# Patient Record
Sex: FEMALE | Race: WHITE | NOT HISPANIC OR LATINO | Employment: FULL TIME | URBAN - METROPOLITAN AREA
[De-identification: names, ages, dates, MRNs, and addresses within clinical notes are randomized per-mention and may not be internally consistent; named-entity substitution may affect disease eponyms.]

---

## 2019-11-27 LAB
ABO + RH BLD: NORMAL
ABO + RH BLD: NORMAL
BLD GP AB SCN SERPL QL: NORMAL
C TRACH DNA SPEC QL PROBE+SIG AMP: NEGATIVE
HBV SURFACE AG SERPL QL IA: NORMAL
HIV 1+2 AB+HIV1 P24 AG SERPL QL IA: NON REACTIVE
N GONORRHOEA DNA SPEC QL PROBE+SIG AMP: NEGATIVE
RUBELLA ANTIBODY IGG QUANTITATIVE: NORMAL IU/ML
TREPONEMA ANTIBODIES: NON REACTIVE

## 2020-06-02 ENCOUNTER — HOSPITAL ENCOUNTER (OUTPATIENT)
Facility: CLINIC | Age: 35
Setting detail: OBSERVATION
Discharge: HOME | End: 2020-06-03
Attending: OBSTETRICS & GYNECOLOGY | Admitting: OBSTETRICS & GYNECOLOGY
Payer: COMMERCIAL

## 2020-06-02 DIAGNOSIS — R10.11 RIGHT UPPER QUADRANT ABDOMINAL PAIN: Primary | ICD-10-CM

## 2020-06-02 PROBLEM — R10.9 ABDOMINAL PAIN: Status: ACTIVE | Noted: 2020-06-02

## 2020-06-02 LAB
ALT SERPL W P-5'-P-CCNC: 24 U/L (ref 0–50)
AST SERPL W P-5'-P-CCNC: 20 U/L (ref 0–45)
BASOPHILS # BLD AUTO: 0 10E9/L (ref 0–0.2)
BASOPHILS NFR BLD AUTO: 0.2 %
DIFFERENTIAL METHOD BLD: ABNORMAL
EOSINOPHIL # BLD AUTO: 0.1 10E9/L (ref 0–0.7)
EOSINOPHIL NFR BLD AUTO: 0.4 %
ERYTHROCYTE [DISTWIDTH] IN BLOOD BY AUTOMATED COUNT: 13.2 % (ref 10–15)
HCT VFR BLD AUTO: 31.4 % (ref 35–47)
HGB BLD-MCNC: 10.9 G/DL (ref 11.7–15.7)
IMM GRANULOCYTES # BLD: 0.1 10E9/L (ref 0–0.4)
IMM GRANULOCYTES NFR BLD: 0.5 %
LYMPHOCYTES # BLD AUTO: 1.8 10E9/L (ref 0.8–5.3)
LYMPHOCYTES NFR BLD AUTO: 14.6 %
MCH RBC QN AUTO: 32.8 PG (ref 26.5–33)
MCHC RBC AUTO-ENTMCNC: 34.7 G/DL (ref 31.5–36.5)
MCV RBC AUTO: 95 FL (ref 78–100)
MONOCYTES # BLD AUTO: 1 10E9/L (ref 0–1.3)
MONOCYTES NFR BLD AUTO: 8.3 %
NEUTROPHILS # BLD AUTO: 9.4 10E9/L (ref 1.6–8.3)
NEUTROPHILS NFR BLD AUTO: 76 %
NRBC # BLD AUTO: 0 10*3/UL
NRBC BLD AUTO-RTO: 0 /100
PLATELET # BLD AUTO: 156 10E9/L (ref 150–450)
RBC # BLD AUTO: 3.32 10E12/L (ref 3.8–5.2)
SARS-COV-2 PCR COMMENT: NORMAL
SARS-COV-2 RNA SPEC QL NAA+PROBE: NEGATIVE
SARS-COV-2 RNA SPEC QL NAA+PROBE: NORMAL
SPECIMEN SOURCE: NORMAL
SPECIMEN SOURCE: NORMAL
URATE SERPL-MCNC: 5.1 MG/DL (ref 2.6–6)
WBC # BLD AUTO: 12.3 10E9/L (ref 4–11)

## 2020-06-02 PROCEDURE — 36415 COLL VENOUS BLD VENIPUNCTURE: CPT | Performed by: OBSTETRICS & GYNECOLOGY

## 2020-06-02 PROCEDURE — 84460 ALANINE AMINO (ALT) (SGPT): CPT | Performed by: OBSTETRICS & GYNECOLOGY

## 2020-06-02 PROCEDURE — G0378 HOSPITAL OBSERVATION PER HR: HCPCS

## 2020-06-02 PROCEDURE — U0003 INFECTIOUS AGENT DETECTION BY NUCLEIC ACID (DNA OR RNA); SEVERE ACUTE RESPIRATORY SYNDROME CORONAVIRUS 2 (SARS-COV-2) (CORONAVIRUS DISEASE [COVID-19]), AMPLIFIED PROBE TECHNIQUE, MAKING USE OF HIGH THROUGHPUT TECHNOLOGIES AS DESCRIBED BY CMS-2020-01-R: HCPCS | Performed by: OBSTETRICS & GYNECOLOGY

## 2020-06-02 PROCEDURE — 25000132 ZZH RX MED GY IP 250 OP 250 PS 637: Performed by: OBSTETRICS & GYNECOLOGY

## 2020-06-02 PROCEDURE — 85025 COMPLETE CBC W/AUTO DIFF WBC: CPT | Performed by: OBSTETRICS & GYNECOLOGY

## 2020-06-02 PROCEDURE — 84450 TRANSFERASE (AST) (SGOT): CPT | Performed by: OBSTETRICS & GYNECOLOGY

## 2020-06-02 PROCEDURE — 84550 ASSAY OF BLOOD/URIC ACID: CPT | Performed by: OBSTETRICS & GYNECOLOGY

## 2020-06-02 PROCEDURE — 25000132 ZZH RX MED GY IP 250 OP 250 PS 637

## 2020-06-02 PROCEDURE — 87086 URINE CULTURE/COLONY COUNT: CPT | Performed by: OBSTETRICS & GYNECOLOGY

## 2020-06-02 RX ORDER — ACETAMINOPHEN 325 MG/1
975 TABLET ORAL EVERY 8 HOURS PRN
Status: DISCONTINUED | OUTPATIENT
Start: 2020-06-02 | End: 2020-06-03 | Stop reason: HOSPADM

## 2020-06-02 RX ORDER — OXYCODONE HYDROCHLORIDE 5 MG/1
5 TABLET ORAL EVERY 4 HOURS PRN
Status: DISCONTINUED | OUTPATIENT
Start: 2020-06-02 | End: 2020-06-03 | Stop reason: HOSPADM

## 2020-06-02 RX ORDER — DOCUSATE SODIUM 100 MG/1
100 CAPSULE, LIQUID FILLED ORAL 2 TIMES DAILY
Status: DISCONTINUED | OUTPATIENT
Start: 2020-06-02 | End: 2020-06-03 | Stop reason: HOSPADM

## 2020-06-02 RX ORDER — HYDROXYZINE HYDROCHLORIDE 50 MG/1
50 TABLET, FILM COATED ORAL EVERY 6 HOURS PRN
Status: DISCONTINUED | OUTPATIENT
Start: 2020-06-02 | End: 2020-06-03 | Stop reason: HOSPADM

## 2020-06-02 RX ORDER — ACETAMINOPHEN 325 MG/1
975 TABLET ORAL EVERY 4 HOURS PRN
Status: DISCONTINUED | OUTPATIENT
Start: 2020-06-02 | End: 2020-06-02

## 2020-06-02 RX ORDER — NALOXONE HYDROCHLORIDE 0.4 MG/ML
.1-.4 INJECTION, SOLUTION INTRAMUSCULAR; INTRAVENOUS; SUBCUTANEOUS
Status: DISCONTINUED | OUTPATIENT
Start: 2020-06-02 | End: 2020-06-03 | Stop reason: HOSPADM

## 2020-06-02 RX ORDER — ONDANSETRON 4 MG/1
8 TABLET, ORALLY DISINTEGRATING ORAL EVERY 8 HOURS PRN
Status: DISCONTINUED | OUTPATIENT
Start: 2020-06-02 | End: 2020-06-03 | Stop reason: HOSPADM

## 2020-06-02 RX ORDER — ONDANSETRON 2 MG/ML
4 INJECTION INTRAMUSCULAR; INTRAVENOUS EVERY 6 HOURS PRN
Status: DISCONTINUED | OUTPATIENT
Start: 2020-06-02 | End: 2020-06-03 | Stop reason: HOSPADM

## 2020-06-02 RX ORDER — ACETAMINOPHEN 325 MG/1
TABLET ORAL
Status: COMPLETED
Start: 2020-06-02 | End: 2020-06-02

## 2020-06-02 RX ORDER — CALCIUM CARBONATE 500 MG/1
500 TABLET, CHEWABLE ORAL 3 TIMES DAILY PRN
Status: DISCONTINUED | OUTPATIENT
Start: 2020-06-02 | End: 2020-06-03 | Stop reason: HOSPADM

## 2020-06-02 RX ORDER — HYDROXYZINE HYDROCHLORIDE 50 MG/1
100 TABLET, FILM COATED ORAL EVERY 6 HOURS PRN
Status: DISCONTINUED | OUTPATIENT
Start: 2020-06-02 | End: 2020-06-03 | Stop reason: HOSPADM

## 2020-06-02 RX ADMIN — OXYCODONE HYDROCHLORIDE 5 MG: 5 TABLET ORAL at 19:06

## 2020-06-02 RX ADMIN — HYDROXYZINE HYDROCHLORIDE 100 MG: 50 TABLET, FILM COATED ORAL at 20:22

## 2020-06-02 RX ADMIN — ACETAMINOPHEN 975 MG: 325 TABLET, FILM COATED ORAL at 14:06

## 2020-06-02 RX ADMIN — OXYCODONE HYDROCHLORIDE 5 MG: 5 TABLET ORAL at 14:53

## 2020-06-02 RX ADMIN — CALCIUM CARBONATE (ANTACID) CHEW TAB 500 MG 500 MG: 500 CHEW TAB at 18:27

## 2020-06-02 RX ADMIN — DOCUSATE SODIUM 100 MG: 100 CAPSULE, LIQUID FILLED ORAL at 18:27

## 2020-06-02 RX ADMIN — ACETAMINOPHEN 975 MG: 325 TABLET ORAL at 14:06

## 2020-06-02 NOTE — H&P
34 yo  at 35 1/7 weeks gest with RUQ pain  Has had episodic RUQ discomfort throughout the pregnancy, and was associated with numbness,  but this changed character, and became more severe and constant last evening  Patient states that this radiates to her upper back, and up to the collarbone; not assoc with dyspnia  This is worse when the patient lays down or takes a deep breath  Baby is active, and there is no bleeding or regular contractions  Patient was evaluated at [facility] yesterday: PIH labs were normal other than slight decrease in platelets at 147,000 (150-400 thousand)  RUQ ultrasound this am shows no abnormalities other than a mildly dilated right ureter (likely physiological for pregnancy)  Normal NIPT  O  (neg)  AFP normal  Uneventful pregnancy otherwise      Allergies:Ceclor  Past surgical history: tonsils, wisdom teeth,mole removal    OE: H&N normal , no thyromegaly  CHEST: clear, no CVA tenderness  CVS: S1, S2 normal, no S3 or murmurs  ABD:gravid, uterus non tender, and soft  Severe discomfort on gentle pressure on the lower ribs, per pt recreates the pain    IMP: RUQ chest wall pain likely costochondritis,  unlikly pneumonia,or PE  Plan as per Dr. TILLMAN is to admit for pain control and reevaluate in am  Will order oral narcotics, with NST every shift,

## 2020-06-02 NOTE — PROVIDER NOTIFICATION
06/02/20 1600   Provider Notification   Provider Name/Title Dr Sow   Method of Notification Phone   Notification Reason Status Update   Dr Sow called unit for an update on pt. New orders received for cbc, ast, alt, uric acid and urine cx. Pt updated.

## 2020-06-02 NOTE — PROVIDER NOTIFICATION
06/02/20 1625   Provider Notification   Provider Name/Title Dr Maya   Method of Notification Phone   Notification Reason Status Update   Dr Maya called unit for an update on pt. No new orders received.

## 2020-06-02 NOTE — PLAN OF CARE
Pt continues to c/o right rib pain. Oxycodone partially relieves the pain. Pt up in room independently.  supportive at bedside.

## 2020-06-02 NOTE — PLAN OF CARE
Pt and her  arrived and admitted to room 211. Pt oriented to room. Prenatal reviewed. Awaiting Dr's orders.

## 2020-06-02 NOTE — PROVIDER NOTIFICATION
Dr Sow notified via phone of pt's lab results. New orders received for colace and atarax. Pt aware.

## 2020-06-03 ENCOUNTER — HOSPITAL ENCOUNTER (OUTPATIENT)
Facility: CLINIC | Age: 35
Setting detail: OBSERVATION
End: 2020-06-03
Admitting: OBSTETRICS & GYNECOLOGY
Payer: COMMERCIAL

## 2020-06-03 VITALS
OXYGEN SATURATION: 94 % | RESPIRATION RATE: 16 BRPM | SYSTOLIC BLOOD PRESSURE: 110 MMHG | TEMPERATURE: 98.8 F | DIASTOLIC BLOOD PRESSURE: 69 MMHG

## 2020-06-03 LAB
BACTERIA SPEC CULT: NO GROWTH
Lab: NORMAL
SPECIMEN SOURCE: NORMAL

## 2020-06-03 PROCEDURE — 25000132 ZZH RX MED GY IP 250 OP 250 PS 637: Performed by: OBSTETRICS & GYNECOLOGY

## 2020-06-03 PROCEDURE — G0378 HOSPITAL OBSERVATION PER HR: HCPCS

## 2020-06-03 RX ORDER — OXYCODONE HYDROCHLORIDE 5 MG/1
5 TABLET ORAL EVERY 4 HOURS PRN
Qty: 5 TABLET | Refills: 0 | Status: ON HOLD | OUTPATIENT
Start: 2020-06-03 | End: 2022-09-20

## 2020-06-03 RX ORDER — HYDROXYZINE HYDROCHLORIDE 50 MG/1
100 TABLET, FILM COATED ORAL EVERY 6 HOURS PRN
Qty: 20 TABLET | Refills: 0 | Status: ON HOLD | OUTPATIENT
Start: 2020-06-03 | End: 2022-09-20

## 2020-06-03 RX ADMIN — OXYCODONE HYDROCHLORIDE 5 MG: 5 TABLET ORAL at 00:18

## 2020-06-03 RX ADMIN — ACETAMINOPHEN 975 MG: 325 TABLET, FILM COATED ORAL at 00:18

## 2020-06-03 NOTE — PLAN OF CARE
BPP cancelled due to reactive NST obtained.  Informed pt of change in FHT agreeable to discontinuing the BPP.

## 2020-06-03 NOTE — PLAN OF CARE
"Pt reports \"feeling better\" today after sleeping well last night with vistaril and oxycodone.  She is still \"uncomfortable\" this morning but declines pain meds and states pain is \"manageable\".  After speaking with Dr. Sow this am pt is comfortable being discharged home with oxycodone and vistaril and agreeable to plan of care for follow up.  She denies vaginal bleeding, ROM, cramping or regular contraction pain.  Pt is able to ambulate independently   0912-Informed Dr. Sow fetal tracing is non reactive at this time after extended monitoring.  BPP recommended and pt is agreeable.  "

## 2020-06-03 NOTE — DISCHARGE INSTRUCTIONS
Discharge Instruction for Undelivered Patients      You were seen for: uncontrolled pain  We Consulted: Dr. Sow  You had (Test or Medicine):fetal and uterine monitoring, oxycodone, vistaril     Diet:   Drink 8 to 12 glasses of liquids (milk, juice, water) every day.  You may eat meals and snacks.     Activity:  Call your doctor or nurse midwife if your baby is moving less than usual.     Call your provider if you notice:  Swelling in your face or increased swelling in your hands or legs.  Headaches that are not relieved by Tylenol (acetaminophen).  Changes in your vision (blurring: seeing spots or stars.)  Nausea (sick to your stomach) and vomiting (throwing up).   Weight gain of 5 pounds or more per week.  Heartburn that doesn't go away.  Signs of bladder infection: pain when you urinate (use the toilet), need to go more often and more urgently.  The bag of knott (rupture of membranes) breaks, or you notice leaking in your underwear.  Bright red blood in your underwear.  Abdominal (lower belly) or stomach pain.  For first baby: Contractions (tightening) less than 5 minutes apart for one hour or more.  Increase or change in vaginal discharge (note the color and amount)      Follow-up:  As scheduled in the clinic

## 2020-06-03 NOTE — PLAN OF CARE
"VSS throughout night. Fetal/uterine monitoring appropriate, see flowsheet. Patient awake when RN enters room to round this morning. Spouse at bedside, attentive to needs. Pt reports sleeping a total of 6 or so hours last night and states \"I think that helped a lot\". Pt denies pain at this time, but also points out that she has not done much moving around. Patient denies other needs at this time, call light within reach, reminded to order breakfast. POC discussed with patient and spouse.  "

## 2020-06-03 NOTE — PROGRESS NOTES
[facility] Obstetrics Antepartum Progress Note  6/3/2020     S: Pt doing well. Pain is not well controlled. GFM. Slept overnight but has to sleep more upright and can't roll to her sides. Bleeding None. Normal bowel and bladder functions.    O:  /70   Temp 97.2  F (36.2  C) (Temporal)   Resp 16   SpO2 94%    Gen: healthy, alert and no distress    Resp: nonlabored breathing but breathing shallow due to rib pain  Abd: soft, gravid  Ext: non-tender, no edema    Recent Results (from the past 24 hour(s))   Asymptomatic COVID-19 Virus (Coronavirus) by PCR    Collection Time: 06/02/20  2:40 PM    Specimen: Nasopharyngeal   Result Value Ref Range    COVID-19 Virus PCR to [facility] - Source Nasopharyngeal     COVID-19 Virus PCR to [facility] - Result       Test received-See reflex to IDDL test SARS CoV2 (COVID-19) Virus RT-PCR   SARS-CoV-2 COVID-19 Virus (Coronavirus) RT-PCR Nasopharyngeal    Collection Time: 06/02/20  2:40 PM    Specimen: Nasopharyngeal   Result Value Ref Range    SARS-CoV-2 Virus Specimen Source Nasopharyngeal     SARS-CoV-2 PCR Result NEGATIVE     SARS-CoV-2 PCR Comment       This automated, real-time RT-PCR assay by [employer] on the [employer] has   been given Emergency Use Authorization (EUA) for the in vitro qualitative detection of RNA   from the SARS-CoV2 virus in nasopharyngeal swabs in viral transport medium from patients   with signs and symptoms of infection who are suspected of COVID-19. The performance is   unknown in asymptomatic patients.     CBC with platelets differential    Collection Time: 06/02/20  4:17 PM   Result Value Ref Range    WBC 12.3 (H) 4.0 - 11.0 10e9/L    RBC Count 3.32 (L) 3.8 - 5.2 10e12/L    Hemoglobin 10.9 (L) 11.7 - 15.7 g/dL    Hematocrit 31.4 (L) 35.0 - 47.0 %    MCV 95 78 - 100 fl    MCH 32.8 26.5 - 33.0 pg    MCHC 34.7 31.5 - 36.5 g/dL    RDW 13.2 10.0 - 15.0 %    Platelet Count 156 150 - 450 10e9/L    Diff Method Automated Method     %  Neutrophils 76.0 %    % Lymphocytes 14.6 %    % Monocytes 8.3 %    % Eosinophils 0.4 %    % Basophils 0.2 %    % Immature Granulocytes 0.5 %    Nucleated RBCs 0 0 /100    Absolute Neutrophil 9.4 (H) 1.6 - 8.3 10e9/L    Absolute Lymphocytes 1.8 0.8 - 5.3 10e9/L    Absolute Monocytes 1.0 0.0 - 1.3 10e9/L    Absolute Eosinophils 0.1 0.0 - 0.7 10e9/L    Absolute Basophils 0.0 0.0 - 0.2 10e9/L    Abs Immature Granulocytes 0.1 0 - 0.4 10e9/L    Absolute Nucleated RBC 0.0    AST    Collection Time: 20  4:17 PM   Result Value Ref Range    AST 20 0 - 45 U/L   ALT    Collection Time: 20  4:17 PM   Result Value Ref Range    ALT 24 0 - 50 U/L   Uric acid    Collection Time: 20  4:17 PM   Result Value Ref Range    Uric Acid 5.1 2.6 - 6.0 mg/dL   Urine Culture    Collection Time: 20  5:20 PM    Specimen: Urine clean catch; Midstream Urine   Result Value Ref Range    Specimen Description Midstream Urine     Special Requests Specimen received in preservative     Culture Micro PENDING    ]  A: 35 year old  HD#1 IUP at 35 wks  admitted for right sided pain-costochondritis    P: Will be discontinue home since we ruled out all other causes for pain  Pt is to see chiro today  Labs were stable  To use heat or ice to help with pain  Will give oxycodone and vistaril to try to help  F/u in office next week but to call sooner if things worsen      Amy Sow, DO   Obstetrics, Gynecology, and Infertility

## 2020-06-03 NOTE — PLAN OF CARE
Report received from RADHA Gupta RN. Pt care assumed. POC discussed at bedside with pt and spouse. To perform monitoring and give visteril at 2030 per patient request.

## 2020-06-03 NOTE — PLAN OF CARE
Monitoring complete, patient reports intermittent pain in RUQ of abdomen, but states she feels is going to be able to rest. Call light in reach, patient agrees to notify RN if anything is needed. Spouse at bedside and supportive. Pt denies need for additional nausea medication, and is aware of tylenol and oxycodone that is available and their due times should she need.

## 2020-06-03 NOTE — PLAN OF CARE
Pt was given meds from discharge pharmacy along with triage discharge instructions.  Pt verbalized understanding and agreement with plan of care.  Discharged ambulatory at 1000.

## 2020-06-03 NOTE — PLAN OF CARE
Pt with call light on. Reports she has slept for approximately 3 hours prior to calling but that she has woken up and the pain has returned. Tylenol and Oxycodone given. Denies other needs at this time and states she will attempt to continue to sleep.

## 2020-06-03 NOTE — PROVIDER NOTIFICATION
06/02/20 2027   Provider Notification   Provider Name/Title Dr Adams   Method of Notification Electronic Page   Request Evaluate - Remote   Notification Reason Other (Comment)  (order clarification)   Provider updated regarding patient nausea. PO zofran order received. Also clarified orders for monitoring TID and vital signs Q 4 hours while awake. Orders entered and modified as needed. Will continue to monitor.

## 2022-09-07 ENCOUNTER — OFFICE VISIT (OUTPATIENT)
Dept: MATERNAL FETAL MEDICINE | Facility: CLINIC | Age: 37
End: 2022-09-07
Attending: OBSTETRICS & GYNECOLOGY
Payer: COMMERCIAL

## 2022-09-07 ENCOUNTER — PRE VISIT (OUTPATIENT)
Dept: MATERNAL FETAL MEDICINE | Facility: CLINIC | Age: 37
End: 2022-09-07

## 2022-09-07 ENCOUNTER — TRANSCRIBE ORDERS (OUTPATIENT)
Dept: MATERNAL FETAL MEDICINE | Facility: CLINIC | Age: 37
End: 2022-09-07

## 2022-09-07 DIAGNOSIS — O09.522 MULTIGRAVIDA OF ADVANCED MATERNAL AGE IN SECOND TRIMESTER: ICD-10-CM

## 2022-09-07 DIAGNOSIS — O26.90 PREGNANCY RELATED CONDITION, ANTEPARTUM: ICD-10-CM

## 2022-09-07 DIAGNOSIS — O26.90 PREGNANCY RELATED CONDITION, ANTEPARTUM: Primary | ICD-10-CM

## 2022-09-07 DIAGNOSIS — O28.0 ABNORMAL MATERNAL SERUM SCREENING TEST: Primary | ICD-10-CM

## 2022-09-07 PROCEDURE — 96040 HC GENETIC COUNSELING, EACH 30 MINUTES: CPT | Performed by: GENETIC COUNSELOR, MS

## 2022-09-07 NOTE — PROGRESS NOTES
[facility] [place]  Genetic Counseling Consult    Patient: Rachel Ryder YOB: 1985   Date of Service: 22      Rachel Ryder was seen at [facility] [place] for genetic consultation due to a positive noninvasive prenatal screen for trisomy 21 and advanced maternal age. The patient was accompanied by her partner, Ty to today's visit.        Impression/Plan:   1.  Rachel had a genetic counseling consultation today. She underwent [id number] NIPT earlier in this pregnancy, which screened POSITIVE for trisomy 21 (sex of baby NOT disclosed). We reviewed this result in detail today and availability of diagnostic testing as well as close ultrasound surveillance. The couple would like to proceed with amniocentesis as soon as possible, this has been scheduled for . Rachel and Ty appeared understandably saddened and are processing this unexpected result. They were provided with written information regarding amniocentesis as well as my direct contact information and encouraged to reach out as questions or concerns arise.     Pregnancy History:   /Parity:    Age at Delivery: 37 year old  MIRIAM: 3/7/2023, by Last Menstrual Period  Gestational Age: 14w1d    Rachel cutler pregnancy history is significant for one term vaginal delivery.    Medical History:   Rachel cutler reported medical history is not expected to impact pregnancy management or risks to fetal development.       Risk Assessment for Chromosome Conditions:   We explained that the risk for fetal chromosome abnormalities increases with maternal age. We discussed specific features of common chromosome abnormalities, including Down syndrome, trisomy 13, trisomy 18, and sex chromosome trisomies.      - At age 37 at midtrimester, the risk to have a baby with Down syndrome is 1 in 274.     - At age 37 at midtrimester, the risk to have a baby with any chromosome abnormality is 1 in 135.       Rachel  underwent [id number] NIPT with her primary OB which returned screen POSITIVE for trisomy 21 The results are normal for chromosome 13, chromosome 18, and sex chromosomes (no aneuploidy detected).  These results increase the risk of fetal aneuploidy for chromosome 21. We discussed the possible explanations for a positive NIPT result include:  o This result could represent a true positive. Using the patient's age related risk for trisomy 21 and the sensitivity and specificity of the NIPT test for [facility] laboratory, the calculated positive predictive value (the likelihood this is a real result) is approximately 78.9% (as calculated by the performing laboratory). Trisomy 21 or Down syndrome is a variable chromosome condition that is characterized by specific facial features, low muscle tone, and variable intellectual disabilities. Some children with Down syndrome can also have heart defects, vision issues, hearing loss, thyroid concerns, and additional health concerns. Down syndrome most often occurs due to nondisjunction with a low recurrence risk, however more rarely can be the result of a translocation. Chromosome structure is assessed on chromosome analysis.   o Rachel 's result could represent a false positive and her pregnancy may be at low risk for aneuploidy. Given PPV noted above, there is an estimated 21.1% chance this is a false positive result. With a false positive result, there can be a concern for confined placental mosaicism in which case there are trisomy 21 cells in the placenta, however not in the fetus.     We reviewed that NIPT is a screening test and does not replace the accuracy obtained from invasive prenatal testing. Rachel was offered the option of diagnostic testing. We reviewed the benefits and limitations of amniocentesis.  We also discussed the option of close ultrasound surveillance. Prenatal ultrasound, however does not rule out or diagnose chromosome conditions though can provide  additional information to the clinical picture and interpretation of result (especially in the setting of ultrasound findings).     Genetic Amniocentesis  - This is an invasive procedure typically performed at ~15 weeks or later, through which amniotic fluid is obtained for the purpose of chromosome analysis and/or other prenatal genetic analysis.  - Amniocentesis is considered a diagnostic test for chromosome problems during pregnancy.  - The risk of complications including pregnancy loss associated with amniocentesis is generally estimated to be 1/500.  - We reviewed TAT for FISH preliminary analysis (typically 24 hours) and final chromosome (up to two weeks)   - We reviewed possibility that membranes may not be fused with an gestational age in the amniocentesis window  - We also discussed limitations of CVS procedure >14w GA vs amniocentesis procedure after 15w including testing turn around time and that amniocentesis avoids diagnostic uncertainty related to confined placental mosaicism.      Preliminary FISH result/sampling by direct examination of cytotrophoblast can have the risk of false positive results, and therefore long-term cultures of mesenchymal cells (cultured chromosome analysis) is also performed concurrently as these cells better reflect fetal, rather than the placental, genotype. This is particularly important when CVS is performed because of a positive result from testing cell-free nucleic acids in maternal blood (NIPT), since these nucleic acids also originate from cytotrophoblast. Where as amniocentesis is testing primarily epithelioid cells (more directly related to fetus). Given current gestational age, FISH on amniocentesis would likely have a quicker TAT than testing performed on CVS culture.     The couple would like to proceed with amniocentesis as soon as possible, this has been scheduled for 9/16.     We reviewed the benefits and limitations of this testing. Screening tests provide a  risk assessment specific to the pregnancy for certain fetal chromosome abnormalities, but cannot definitively diagnose or exclude a fetal chromosome abnormality. Follow-up genetic counseling and consideration of diagnostic testing is recommended with any abnormal screening result.     Diagnostic tests carry inherent risks- including risk of miscarriage- that require careful consideration. These tests can detect fetal chromosome abnormalities with greater than 99% certainty. Results can be compromised by maternal cell contamination or mosaicism, and are limited by the resolution of cytogenetic G-banding technology. There is no screening nor diagnostic test that can detect all forms of birth defects or mental disability.     It was a pleasure to be involved with [facility]. Face-to-face time of the meeting was 20 minutes.    Roz Chowdhury MS, [facility]  Licensed Genetic Counselor  [facility]  Maternal Fetal Medicine  Ph: [phone]  [email or web address]

## 2022-09-08 ENCOUNTER — TELEPHONE (OUTPATIENT)
Dept: MATERNAL FETAL MEDICINE | Facility: CLINIC | Age: 37
End: 2022-09-08

## 2022-09-08 NOTE — TELEPHONE ENCOUNTER
September 8, 2022    Email reply to Rachel:    Rachel and Ty,    Thank you for reaching out, I was thinking about you both and hope you are doing the best you can be at this time. Again I am so sorry for the unexpected information you have received in this pregnancy. I know that these are not easy scenarios to think about and ones that we never thought or hope we would have to. We are here to support you in whatever way is most helpful to you.     Our team works closely with couples who are faced with these impossible and difficult decisions every day, please know that you are not alone and that we are here help you navigate next steps as they come. It can be really challenging to process difficult information as it comes in real time and it s okay to take as much time as needed to do so. Please do continue to send questions as they arise.     I usually start by confirming with your primary OB office that they do not have a set process and that they would like for us to coordinate. We first begin with looking into insurance coverage as this can sometimes be a deciding factor for where someone might choose to go (hospital setting vs outpatient). We do this by sending insurance information to our financial counselors who then contact your insurance directly to review your benefits and coverage for these services. This can be initiated at any point (our financial counselors work Monday through Friday) and usually takes 1-2 days for us to hear back. There is a surgical procedure called D&E that can be done within our [employer] system as well as through community resources such as [facility] ([facility] Parenthood is the most affordable option for couple s who do not have insurance coverage for these services as they have significantly lower out of pocket costs though I always like to make sure I mention that it is primarily the same doctors that work at both our hospital and planned parenthood). There is also the  option for induction of labor in the second trimester that can be done only in a hospital setting including in our [facility] system (can t be done at [facility]). Once insurance coverage is checked, we then help gather information to send to either our group here ([facility]) or planned parenthood and then their team begins checking availability and reaches out to you directly to coordinate appointments. If within our [place] system, one of our doctors also has to complete  the right to know  form where they read through a legal piece of information that just states that you have been provided your options and this just has to be done 24 hours prior to the beginning of any procedure. How quickly this process happens depends a lot on insurance coverage and availability, which can be very variable.     Please let me know if there s any additional information that would be helpful to have at this time,    Roz Chowdhury MS, [facility]  Licensed Genetic Counselor  [facility]  Maternal Fetal Medicine  Ph: [phone]  [email or web address]

## 2022-09-08 NOTE — TELEPHONE ENCOUNTER
September 8, 2022    Email received from Rachel:    Wilver Courtney,     My name is Rachel Ryder and my  and I met with you yesterday.  We really appreciate the information that you provided us and the space you gave us to process.  I know that we have the amnio scheduled for next Friday and that we will have the FISH on Saturday.    I want to be prepared for all scenarios and next steps.  If the result confirms the results of the initial screener and if we make the impossible decision to terminate the pregnancy - what is that process? what happens next? and who do we work with?  I just want to have all the information and process it in advance as I know I may have a hard time doing so in real time.     I appreciate your help in navigating this situation.    Rachel Crawford

## 2022-09-09 NOTE — TELEPHONE ENCOUNTER
September 9, 2022    I spoke with KAYLEIGH Tinajero with Rachel's primary OB office to provide update regarding upcoming amniocentesis and testing timeline. She shared that she believes their office prefers that we help coordinate TOP and she will update Dr. Sow. I will continue to update their team as results return.     Roz Chowdhury MS, [facility]  Licensed Genetic Counselor  [facility]  Maternal Fetal Medicine  Ph: [phone]  [email or web address]

## 2022-09-09 NOTE — TELEPHONE ENCOUNTER
September 9, 2022    Email received from Rachel:    MARIUSZ Courtney,     Thank you so much for the information.  It's both informative and difficult to hear as I hadn't yet fully thought about or processed the physical toll of terminating a pregnancy during the 2nd trimester - I've only been thinking about the emotional.  At this point, I believe this is the decision that Ty and I would make if the results are confirmed next week.  Is it possible to start the process of having you talk to our OB's office as well as send our insurance information to your financial counselors?  The waiting and the awareness that I continue to grow a baby that I may decide to terminate is unbearable.  Should this be the decision that we have to make I want to be able to move as quickly as possible.   I feel like a monster saying that but it's the truth for me.     Thanks Rachel Courtney + Ty

## 2022-09-09 NOTE — TELEPHONE ENCOUNTER
September 9, 2022    Email reply sent to Rachel:    What you have shared with me as you are processing are very expected and normal thoughts, feelings, and questions. I know our physicians often share that the emotional toll/recovery may feel much more significant than the physical recovery. I do have information about taking care after D&E or induction that I can share at any time if that would be helpful, just let me know.     I just updated Dr. Sow s team with our plan and they did share that they would have us help coordinate next steps if needed. I will continue to keep their team in the loop. I just sent along insurance information to our financial counselors, which I would expect back early next week. I will follow up with you at that time.     It is not a monstrous thought to want to move forward once that difficult decision is made, and that thought is one that I think is often shared with most couples who may be in the same situation. I wanted to let you know that we do have an amazing resource within our team, her name is Jia Wills. She is a behavioral health clinician who works closely with our pregnant patients who may desire additional support and space to talk through things. She offers video/phone visits as well and I would be happy to help connect you with her if you are ever interested.     Roz Chowdhury MS, [facility]  Licensed Genetic Counselor  [facility]  Maternal Fetal Medicine  Ph: [phone]  [email or web address]

## 2022-09-12 NOTE — TELEPHONE ENCOUNTER
September 12, 2022    Email sent to Rachel:    Rachel and Ty,    The financial counselors shared that they spoke with [employer] who stated that prior authorization is required and they request we send clinical records for review. I am hesitant because at this time we technically don t have a diagnosis in the pregnancy, which I think would be most important to provide for insurance PA review. I know this isn t preferred, but I think we might have the highest chance of a PA being approved if we were able to provide diagnostic results for the pregnancy and I could fax this first thing Monday morning.     Here is the additional information regarding where you are at with your current benefits,    BENEFITS:   Co-insurance: 20 %  Family out of pocket: $2870.71 remaining  Family Deductible: $0 remaining    In terms of moving as quick as possible if needed, my other thought is if we wanted to line something up in the event that the amniocentesis is positive and insurance does not approve the PA, we could coordinate an appointment with [facility] knowing that we can cancel if needed. I can always reach out to Annette, their  to check when their next available D&E appointment would be.      Let me know your thoughts,     Roz Chowdhury MS, [facility]  Licensed Genetic Counselor  [facility]  Maternal Fetal Medicine  Ph: [phone]  [email or web address]

## 2022-09-12 NOTE — TELEPHONE ENCOUNTER
September 12, 2022    Left voicemail for Annette,  with [facility] to inquire about their next available D&E appointment after Friday.     Roz Chowdhury MS, [facility]  Licensed Genetic Counselor  [facility]  Maternal Fetal Medicine  Ph: [phone]  [email or web address]

## 2022-09-12 NOTE — TELEPHONE ENCOUNTER
September 12, 2022    Received voicemail from Rachel's partner, Ty ([phone]) to discuss pregnancy information further. Attempted to reach Rachel directly (no consent to communicate on file). Sent email to both with option to call back together or complete consent to communicate.     Also received message [facility], Rachel has appointment for D&E scheduled on 9/26. Following Friday's [facility] appointments they have requested we send over records.     Roz Chowdhury MS, [facility]  Licensed Genetic Counselor  [facility]  Maternal Fetal Medicine  Ph: [phone]  [email or web address]

## 2022-09-12 NOTE — TELEPHONE ENCOUNTER
Date    Email reply to Jemima:    R did not provide an estimated time frame for how long the PA can take. As far as scheduling the procedure in our system, it can be extremely variable. We first send the referral to [facility] and they work with their provider availability and availability in the OR to coordinate. Sometimes they can be scheduled out anywhere from ~1-4 weeks. [place] in general has experienced an increase in these referrals due to legal changes in surrounding states.     I spoke with Annette, their next available appointments are the week of Sept. 26th. She shared that their estimated out of pocket cost starts around $1790. She said sometimes they have different experiences with insurance coverage because they can bill as an office setting as opposed to hospital and they do run their own insurance checks. Annette s direct contact number is [phone] if you wanted to move forward with scheduling anything or have any questions for her.     My understanding is that the D&E procedure has an estimated cost that is significantly higher in our hospital setting, in which case I agree that I would expect you to be paying at least the remaining $2870 toward your out of pocket and then co-insurance. [facility] is able to put together a cost of care estimate if that would be helpful?     Roz Chowdhury MS, [facility]  Licensed Genetic Counselor  [facility]  Maternal Fetal Medicine  Ph: [phone]  [email or web address]

## 2022-09-12 NOTE — TELEPHONE ENCOUNTER
September 12, 2022    Email received from Rachel and Ty:    Good Morning Roz,    Thank you for your continued communication, we really appreciate it.    If we send the test info to [facility] on Monday - what is the estimated timeframe for a response and scheduling of a D&E?    Please reach out to [facility] with [facility] to understand the schedule availability. Could she also provide an estimate for the procedure? It feels likely we will spend at least the $2870 in remaining out of pocket for a covered procedure regardless. It would be helpful to understand the potential costs side by side.    Ty & Rachel

## 2022-09-13 NOTE — TELEPHONE ENCOUNTER
September 13, 2022    I spoke with Ty to review insurance investigation information. Per financial counselors:    Call from [facility], cristina Castillo, Approval #[id number] from 9/9/22-12/7/22.  Any questions call Anna at [phone].    BENEFITS:  Co-insurance: 20 %  Family out of pocket: $2870.71 remaining  Family Deductible: $0 remaining    They would like us to send referral to [facility] to coordinate D&E appointment ASAP following preliminary FISH results on 9/17. Their clinic will then reach out to [place] to coordinate appointments.     Roz Chowdhury MS, [facility]  Licensed Genetic Counselor  [facility]  Maternal Fetal Medicine  Ph: [phone]  [email or web address]    Addendum: [facility] referral faxed 9/13/22

## 2022-09-13 NOTE — TELEPHONE ENCOUNTER
September 13, 2022    Attempted to follow up with Ty to provide update regarding insurance coverage. Left direct contact information for call back.     Roz Chowdhury MS, [facility]  Licensed Genetic Counselor  [facility]  Maternal Fetal Medicine  Ph: [phone]  [email or web address]

## 2022-09-13 NOTE — TELEPHONE ENCOUNTER
September 13, 2022    Received email from [place] with completed authorization to share protected health information with her partner Ty and the couple requested a call at 1:30pm today.     Roz Chowdhury MS, [facility]  Licensed Genetic Counselor  [facility]  Maternal Fetal Medicine  Ph: [phone]  [email or web address]

## 2022-09-14 ENCOUNTER — TELEPHONE (OUTPATIENT)
Dept: OBGYN | Facility: CLINIC | Age: 37
End: 2022-09-14

## 2022-09-14 DIAGNOSIS — Z33.2 TERMINATION OF PREGNANCY (FETUS): Primary | ICD-10-CM

## 2022-09-14 DIAGNOSIS — Z01.818 PREOPERATIVE EXAMINATION: Primary | ICD-10-CM

## 2022-09-14 RX ORDER — MISOPROSTOL 200 UG/1
TABLET ORAL
Qty: 2 TABLET | Refills: 0 | Status: ON HOLD | OUTPATIENT
Start: 2022-09-14 | End: 2022-09-20

## 2022-09-14 RX ORDER — ACETAMINOPHEN 325 MG/1
975 TABLET ORAL ONCE
Status: CANCELLED | OUTPATIENT
Start: 2022-09-14 | End: 2022-09-14

## 2022-09-14 NOTE — TELEPHONE ENCOUNTER
Referral received from [facility] for D&E.  She is being referred to [facility] because of pos NIPT for down syndrome.  Pending amnio/FISH results 9/17/22  Gestational age 15w1d  EDD3/7/23  Medical records have been received    WTRK consent to be completed:at [facility] 9/17/22

## 2022-09-14 NOTE — TELEPHONE ENCOUNTER
September 14, 2022    I spoke with Yoanna with [facility] who confirmed receipt of referral.     Rachel inquired about contact info and I provided her with [facility] number.     Roz Chowdhury MS, [facility]  Licensed Genetic Counselor  [facility]  Maternal Fetal Medicine  Ph: [phone]  [email or web address]

## 2022-09-15 NOTE — TELEPHONE ENCOUNTER
Day of Surgery:    Your surgery is scheduled at [facility].  [facility] [place], [place].  [address]  [id number].  If parking is needed please park in the [place].  If you are unsure how to get to the hospital - search google maps for [facility] [address].    Just stop at the  and security will tell you where you need to go for your surgery.    Arrive at the hospital 2 hours before your surgery at 9 AM on 9/20/22.  Your surgery is scheduled at 11 AM.  Please do not eat or drink after 3 AM.  You may have sips of clear liquids (water, black coffee, Gatorade) up to 9AM.  If you have been prescribed medication to take before surgery or if you have prescription medications that you take everyday, you can take those with a sip of water. Please shower the night before and the morning of the surgery with a special soap called Hibiclens.  If you wash your hair, wash with the special soap after you wash your hair.     Cytotec is medication that will be sent to your preferred pharmacy. This medication should be placed between your cheek and gum in your lower jaw, 2 hours before surgery.    You will need to take a home COVID test, no more than 2 days before surgery, take a picture of it and bring it to your surgery.      A responsible adult will need to drive you home after surgery.

## 2022-09-15 NOTE — TELEPHONE ENCOUNTER
September 15, 2022    Received voicemail from Ty to review upcoming appointments.     Returned the couple's call and spoke with Ty and Rachel regarding what to expect for their upcoming MFM appointment tomorrow. They would like screen to be off for ultrasound. We also reviewed plan for 24hr WRTK to be completed tomorrow.     Roz Chowdhury MS, [facility]  Licensed Genetic Counselor  [facility]  Maternal Fetal Medicine  Ph: [phone]  [email or web address]

## 2022-09-16 ENCOUNTER — HOSPITAL ENCOUNTER (OUTPATIENT)
Dept: ULTRASOUND IMAGING | Facility: CLINIC | Age: 37
Discharge: HOME | End: 2022-09-16
Attending: OBSTETRICS & GYNECOLOGY
Payer: COMMERCIAL

## 2022-09-16 ENCOUNTER — OFFICE VISIT (OUTPATIENT)
Dept: MATERNAL FETAL MEDICINE | Facility: CLINIC | Age: 37
End: 2022-09-16
Attending: OBSTETRICS & GYNECOLOGY
Payer: COMMERCIAL

## 2022-09-16 DIAGNOSIS — O26.90 PREGNANCY RELATED CONDITION, ANTEPARTUM: Primary | ICD-10-CM

## 2022-09-16 DIAGNOSIS — O26.90 PREGNANCY RELATED CONDITION, ANTEPARTUM: ICD-10-CM

## 2022-09-16 DIAGNOSIS — O28.0 ABNORMAL MATERNAL SERUM SCREENING TEST: ICD-10-CM

## 2022-09-16 DIAGNOSIS — O09.522 MULTIGRAVIDA OF ADVANCED MATERNAL AGE IN SECOND TRIMESTER: ICD-10-CM

## 2022-09-16 DIAGNOSIS — O28.0 ABNORMAL MATERNAL SERUM SCREENING TEST: Primary | ICD-10-CM

## 2022-09-16 DIAGNOSIS — Z67.91 RH NEGATIVE, ANTEPARTUM: ICD-10-CM

## 2022-09-16 DIAGNOSIS — O28.3 ABNORMAL PRENATAL ULTRASOUND: ICD-10-CM

## 2022-09-16 DIAGNOSIS — O26.899 RH NEGATIVE, ANTEPARTUM: ICD-10-CM

## 2022-09-16 PROCEDURE — 88271 CYTOGENETICS DNA PROBE: CPT | Performed by: OBSTETRICS & GYNECOLOGY

## 2022-09-16 PROCEDURE — 76946 ECHO GUIDE FOR AMNIOCENTESIS: CPT

## 2022-09-16 PROCEDURE — 76805 OB US >/= 14 WKS SNGL FETUS: CPT | Mod: 26 | Performed by: OBSTETRICS & GYNECOLOGY

## 2022-09-16 PROCEDURE — 96372 THER/PROPH/DIAG INJ SC/IM: CPT | Performed by: OBSTETRICS & GYNECOLOGY

## 2022-09-16 PROCEDURE — 88235 TISSUE CULTURE PLACENTA: CPT | Performed by: OBSTETRICS & GYNECOLOGY

## 2022-09-16 PROCEDURE — 250N000011 HC RX IP 250 OP 636: Performed by: OBSTETRICS & GYNECOLOGY

## 2022-09-16 PROCEDURE — 88275 CYTOGENETICS 100-300: CPT | Performed by: OBSTETRICS & GYNECOLOGY

## 2022-09-16 PROCEDURE — 76805 OB US >/= 14 WKS SNGL FETUS: CPT

## 2022-09-16 PROCEDURE — 999N000069 HC STATISTIC GENETIC COUNSELING, < 16 MIN: Performed by: GENETIC COUNSELOR, MS

## 2022-09-16 PROCEDURE — 88269 CHROMOSOME ANALYS AMNIOTIC: CPT | Performed by: OBSTETRICS & GYNECOLOGY

## 2022-09-16 PROCEDURE — 59000 AMNIOCENTESIS DIAGNOSTIC: CPT | Performed by: OBSTETRICS & GYNECOLOGY

## 2022-09-16 PROCEDURE — 76946 ECHO GUIDE FOR AMNIOCENTESIS: CPT | Mod: 26 | Performed by: OBSTETRICS & GYNECOLOGY

## 2022-09-16 RX ADMIN — HUMAN RHO(D) IMMUNE GLOBULIN 300 MCG: 300 INJECTION, SOLUTION INTRAMUSCULAR at 12:40

## 2022-09-16 NOTE — NURSING NOTE
Pt here for amniocentesis d/t NIPT + T21. Saw [facility], see their dictation.  After consent signed and TimeOut completed, Dr. Calhoun and Dr. Marquez withdrew adequate fluid xone transabdominal pass.    Patient reports sliight pain.  Pt is RH negative.  MD reviewed blood type and screen and Rhogam is indicated.  Rhogam was given today at 1240.  LOT#:  [id number], EXP 10/15/2023. Discharge teaching completed and questions answered.  Pt discharged ambulatory and stable.   Lab alerted by calling phone number on amnio work-aid for [facility].  Cytogenetics notified of specimen.  Specimen transported to main lab, warm hand-off completed to [facility] at 1257.

## 2022-09-16 NOTE — PROGRESS NOTES
[facility] Maternal Fetal Medicine Center  Genetic Counseling Consult    Patient:  Rachel Ryder YOB: 1985   Date of Service:  22      Rachel Ryder was seen at the [facility]aternal [facility]etal [facility] Center for genetic consultation as part of her appointment for ultrasound and amniocentesis due to a positive noninvasive prenatal screen for trisomy 21 and advanced maternal age. The patient was accompanied by her partner, Ty to today's visit.        Impression/Plan:   1. I met with the couple earlier in this pregnancy to review their positive noninvasive prenatal screen for trisomy 21, see prior note. Today they have elected to proceed with ultrasound and genetic amniocentesis. The following testing was ordered on the prenatal sample: FISH preliminary analysis and final chromosome analysis.  Results are expected within 24-48 hours for FISH and two weeks for chromosome analysis. Results will be available in EPIC.  We will contact her to discuss the results, and a copy will be forwarded to the office of the referring OB provider. Rachel requested that we do not leave any results in her voicemail and they are NOT finding out sex of baby information. We reviewed plan for me to call the couple with FISH results over the weekend and then I will notify them via email once final chromosome analysis is available and they will reach out when they are ready to review. The couple is planning to pursue D&E if FISH results are positive and are aware that these are preliminary results. They will complete the 24hr WRTK with Dr. Calhoun today. They have D&E scheduled with [facility] for Tuesday pending results and have also [facility].     Addendum: today's ultrasound revealed a congenital heart defect and thick nuchal fold.     Pregnancy History:   /Parity:    Age at Delivery: 37 year old  MIRIAM: 3/7/2023, by Last Menstrual Period  Gestational Age:  15w3d      Rachel underwent [id number] NIPT with her primary OB which returned screen POSITIVE for trisomy 21 The results are normal for chromosome 13, chromosome 18, and sex chromosomes (no aneuploidy detected).  These results increase the risk of fetal aneuploidy for chromosome 21. We had previously discussed the possible explanations for a positive NIPT result include:  ? This result could represent a true positive, the performing laboratory calculates the PPV at 78.9%. Today we reviewed that Down syndrome most often occurs due to nondisjunction with a low recurrence risk, however more rarely can be the result of a translocation. Chromosome structure is assessed on chromosome analysis. Rachel inquired about ability to determine severity and we reviewed that genetic testing is not able to determine the severity of Down syndrome, we briefly reviewed mosaicism as well.   ? Rachel's result could represent a false positive and her pregnancy may be at low risk for aneuploidy. Given PPV noted above, there is an estimated 21.1% chance this is a false positive result. With a false positive result, there can be a concern for confined placental mosaicism in which case there are trisomy 21 cells in the placenta, however not in the fetus.     We reviewed that NIPT is a screening test and does not replace the accuracy obtained from invasive prenatal testing. Rachel was offered the option of diagnostic testing. We reviewed the benefits and limitations of amniocentesis. We also reviewed that today's ultrasound will provide additional information however prenatal ultrasound does not rule out or diagnose chromosome conditions though can provide additional information to the clinical picture and interpretation of result (especially in the setting of ultrasound findings).   Genetic Amniocentesis  - This is an invasive procedure typically performed at 15 weeks or later, through which amniotic fluid is obtained for the purpose of  chromosome analysis and/or other prenatal genetic analysis.  - Amniocentesis is considered a diagnostic test for chromosome problems during pregnancy.  - The risk of complications including pregnancy loss associated with amniocentesis is generally estimated to be 1/500.     We reviewed the amniocentesis procedure consent form as well as the genetic testing consent form. All questions were answered to her apparent satisfaction. The following testing was ordered on the prenatal sample: FISH preliminary analysis and final chromosome analysis.  Results are expected within 24-48 hours for FISH and two weeks for chromosome analysis. Results will be available in EPIC.  We will contact her to discuss the results, and a copy will be forwarded to the office of the referring OB provider.     The couple is planning to pursue D&E if FISH results are positive and are aware that these are preliminary results. They will complete the 24hr WRTK with Dr. Calhoun today. They have D&E scheduled with [facility] for Tuesday pending results and have also [facility] as their main goal has been quickest TAT between a positive result and D&E appointment. They are also considering out of pocket cost difference. They desire [facility] in the event the elect to proceed with D&E at their clinic.      We reviewed the benefits and limitations of this testing.  Screening tests provide a risk assessment specific to the pregnancy for certain fetal chromosome abnormalities, but cannot definitively diagnose or exclude a fetal chromosome abnormality.  Follow-up genetic counseling and consideration of diagnostic testing is recommended with any abnormal screening result.     Diagnostic tests carry inherent risks- including risk of miscarriage- that require careful consideration. These tests can detect fetal chromosome abnormalities with greater than 99% certainty. Results can be compromised by maternal  cell contamination or mosaicism, and are limited by the resolution of cytogenetic G-banding technology. There is no screening nor diagnostic test that can detect all forms of birth defects or mental disability.    It was a pleasure to be involved with Rachel s care. Face-to-face time of the meeting was 15 minutes.      Roz Chowdhury MS, [facility]  Licensed Genetic Counselor  [facility]  Maternal Fetal Medicine  Ph: [phone]  [email or web address]

## 2022-09-17 ENCOUNTER — TELEPHONE (OUTPATIENT)
Dept: MATERNAL FETAL MEDICINE | Facility: CLINIC | Age: 37
End: 2022-09-17

## 2022-09-17 LAB — INTERPRETATION: NORMAL

## 2022-09-17 NOTE — TELEPHONE ENCOUNTER
September 17, 2022    I spoke with Rachel and Ty to review available preliminary FISH results.     FISH results are ABNORMAL showing three signals for chromosome 21, consistent with trisomy 21. Final chromosome results are pending and expected in 1-2 weeks. I will reach out to Rachel via email to inform her once available per their request.     The couple shared that they are planning to move forward with D&E with S and are planning to update [facility]. They have no additional questions at this time.     Roz Chowdhury MS, [facility]  Licensed Genetic Counselor  [facility]  Maternal Fetal Medicine  Ph: [phone]  [email or web address]

## 2022-09-19 NOTE — PROGRESS NOTES
"Please see \"Imaging\" tab under \"Chart Review\" for details of today's US.    Tamiko Calhoun, DO    "

## 2022-09-20 ENCOUNTER — HOSPITAL ENCOUNTER (OUTPATIENT)
Facility: CLINIC | Age: 37
Discharge: HOME | End: 2022-09-20
Attending: OBSTETRICS & GYNECOLOGY | Admitting: OBSTETRICS & GYNECOLOGY
Payer: COMMERCIAL

## 2022-09-20 ENCOUNTER — ANESTHESIA EVENT (OUTPATIENT)
Dept: SURGERY | Facility: CLINIC | Age: 37
End: 2022-09-20
Payer: COMMERCIAL

## 2022-09-20 ENCOUNTER — ANESTHESIA (OUTPATIENT)
Dept: SURGERY | Facility: CLINIC | Age: 37
End: 2022-09-20
Payer: COMMERCIAL

## 2022-09-20 VITALS
HEART RATE: 70 BPM | DIASTOLIC BLOOD PRESSURE: 60 MMHG | TEMPERATURE: 98.4 F | WEIGHT: 150.13 LBS | OXYGEN SATURATION: 97 % | SYSTOLIC BLOOD PRESSURE: 100 MMHG | RESPIRATION RATE: 16 BRPM

## 2022-09-20 DIAGNOSIS — Z33.2 TERMINATION OF PREGNANCY (FETUS): ICD-10-CM

## 2022-09-20 PROBLEM — O35.13X0 TRISOMY 21 OF FETUS IN CURRENT SINGLETON PREGNANCY: Status: ACTIVE | Noted: 2022-09-20

## 2022-09-20 LAB
ABO/RH(D): ABNORMAL
ANTIBODY ID: NORMAL
ANTIBODY SCREEN: POSITIVE
BASOPHILS # BLD AUTO: 0 10E3/UL (ref 0–0.2)
BASOPHILS NFR BLD AUTO: 0 %
EOSINOPHIL # BLD AUTO: 0.1 10E3/UL (ref 0–0.7)
EOSINOPHIL NFR BLD AUTO: 2 %
ERYTHROCYTE [DISTWIDTH] IN BLOOD BY AUTOMATED COUNT: 12.7 % (ref 10–15)
GLUCOSE BLDC GLUCOMTR-MCNC: 83 MG/DL (ref 70–99)
HCT VFR BLD AUTO: 35.4 % (ref 35–47)
HGB BLD-MCNC: 12.6 G/DL (ref 11.7–15.7)
IMM GRANULOCYTES # BLD: 0 10E3/UL
IMM GRANULOCYTES NFR BLD: 0 %
LYMPHOCYTES # BLD AUTO: 2.2 10E3/UL (ref 0.8–5.3)
LYMPHOCYTES NFR BLD AUTO: 24 %
MCH RBC QN AUTO: 32 PG (ref 26.5–33)
MCHC RBC AUTO-ENTMCNC: 35.6 G/DL (ref 31.5–36.5)
MCV RBC AUTO: 90 FL (ref 78–100)
MONOCYTES # BLD AUTO: 0.7 10E3/UL (ref 0–1.3)
MONOCYTES NFR BLD AUTO: 8 %
NEUTROPHILS # BLD AUTO: 6 10E3/UL (ref 1.6–8.3)
NEUTROPHILS NFR BLD AUTO: 66 %
NRBC # BLD AUTO: 0 10E3/UL
NRBC BLD AUTO-RTO: 0 /100
PLATELET # BLD AUTO: 169 10E3/UL (ref 150–450)
RBC # BLD AUTO: 3.94 10E6/UL (ref 3.8–5.2)
SPECIMEN EXPIRATION DATE: ABNORMAL
SPECIMEN EXPIRATION DATE: NORMAL
WBC # BLD AUTO: 9.1 10E3/UL (ref 4–11)

## 2022-09-20 PROCEDURE — 88304 TISSUE EXAM BY PATHOLOGIST: CPT | Mod: TC | Performed by: OBSTETRICS & GYNECOLOGY

## 2022-09-20 PROCEDURE — 360N000075 HC SURGERY LEVEL 2, PER MIN: Performed by: OBSTETRICS & GYNECOLOGY

## 2022-09-20 PROCEDURE — 86901 BLOOD TYPING SEROLOGIC RH(D): CPT | Performed by: OBSTETRICS & GYNECOLOGY

## 2022-09-20 PROCEDURE — 85025 COMPLETE CBC W/AUTO DIFF WBC: CPT | Performed by: OBSTETRICS & GYNECOLOGY

## 2022-09-20 PROCEDURE — 258N000003 HC RX IP 258 OP 636: Performed by: OBSTETRICS & GYNECOLOGY

## 2022-09-20 PROCEDURE — 250N000013 HC RX MED GY IP 250 OP 250 PS 637

## 2022-09-20 PROCEDURE — 250N000011 HC RX IP 250 OP 636: Performed by: OBSTETRICS & GYNECOLOGY

## 2022-09-20 PROCEDURE — 36415 COLL VENOUS BLD VENIPUNCTURE: CPT | Performed by: OBSTETRICS & GYNECOLOGY

## 2022-09-20 PROCEDURE — 250N000009 HC RX 250: Performed by: OBSTETRICS & GYNECOLOGY

## 2022-09-20 PROCEDURE — 82962 GLUCOSE BLOOD TEST: CPT

## 2022-09-20 PROCEDURE — 370N000017 HC ANESTHESIA TECHNICAL FEE, PER MIN: Performed by: OBSTETRICS & GYNECOLOGY

## 2022-09-20 PROCEDURE — 710N000012 HC RECOVERY PHASE 2, PER MINUTE: Performed by: OBSTETRICS & GYNECOLOGY

## 2022-09-20 PROCEDURE — 250N000011 HC RX IP 250 OP 636: Performed by: NURSE ANESTHETIST, CERTIFIED REGISTERED

## 2022-09-20 PROCEDURE — 272N000001 HC OR GENERAL SUPPLY STERILE: Performed by: OBSTETRICS & GYNECOLOGY

## 2022-09-20 PROCEDURE — 250N000013 HC RX MED GY IP 250 OP 250 PS 637: Performed by: OBSTETRICS & GYNECOLOGY

## 2022-09-20 PROCEDURE — 258N000003 HC RX IP 258 OP 636: Performed by: NURSE ANESTHETIST, CERTIFIED REGISTERED

## 2022-09-20 PROCEDURE — 76998 US GUIDE INTRAOP: CPT | Mod: 26 | Performed by: OBSTETRICS & GYNECOLOGY

## 2022-09-20 PROCEDURE — 999N000141 HC STATISTIC PRE-PROCEDURE NURSING ASSESSMENT: Performed by: OBSTETRICS & GYNECOLOGY

## 2022-09-20 PROCEDURE — 86870 RBC ANTIBODY IDENTIFICATION: CPT | Performed by: OBSTETRICS & GYNECOLOGY

## 2022-09-20 PROCEDURE — 59841 INDUCED ABORTION DILAT&EVAC: CPT | Mod: GC | Performed by: OBSTETRICS & GYNECOLOGY

## 2022-09-20 PROCEDURE — 250N000009 HC RX 250: Performed by: NURSE ANESTHETIST, CERTIFIED REGISTERED

## 2022-09-20 RX ORDER — ACETAMINOPHEN 325 MG/1
975 TABLET ORAL ONCE
Status: DISCONTINUED | OUTPATIENT
Start: 2022-09-20 | End: 2022-09-20 | Stop reason: HOSPADM

## 2022-09-20 RX ORDER — ACETAMINOPHEN 325 MG/1
975 TABLET ORAL ONCE
Status: COMPLETED | OUTPATIENT
Start: 2022-09-20 | End: 2022-09-20

## 2022-09-20 RX ORDER — PROPOFOL 10 MG/ML
INJECTION, EMULSION INTRAVENOUS PRN
Status: DISCONTINUED | OUTPATIENT
Start: 2022-09-20 | End: 2022-09-20

## 2022-09-20 RX ORDER — ACETAMINOPHEN 325 MG/1
650 TABLET ORAL EVERY 6 HOURS PRN
COMMUNITY
Start: 2022-09-20 | End: 2022-09-27

## 2022-09-20 RX ORDER — IBUPROFEN 200 MG
600 TABLET ORAL EVERY 6 HOURS PRN
Qty: 3 TABLET | COMMUNITY
Start: 2022-09-20 | End: 2022-09-27

## 2022-09-20 RX ORDER — DEXAMETHASONE SODIUM PHOSPHATE 4 MG/ML
INJECTION, SOLUTION INTRA-ARTICULAR; INTRALESIONAL; INTRAMUSCULAR; INTRAVENOUS; SOFT TISSUE PRN
Status: DISCONTINUED | OUTPATIENT
Start: 2022-09-20 | End: 2022-09-20

## 2022-09-20 RX ORDER — LIDOCAINE HYDROCHLORIDE 20 MG/ML
INJECTION, SOLUTION INFILTRATION; PERINEURAL PRN
Status: DISCONTINUED | OUTPATIENT
Start: 2022-09-20 | End: 2022-09-20

## 2022-09-20 RX ORDER — IBUPROFEN 200 MG
800 TABLET ORAL ONCE
Status: COMPLETED | OUTPATIENT
Start: 2022-09-20 | End: 2022-09-20

## 2022-09-20 RX ORDER — ONDANSETRON 2 MG/ML
INJECTION INTRAMUSCULAR; INTRAVENOUS PRN
Status: DISCONTINUED | OUTPATIENT
Start: 2022-09-20 | End: 2022-09-20

## 2022-09-20 RX ORDER — SODIUM CHLORIDE, SODIUM LACTATE, POTASSIUM CHLORIDE, CALCIUM CHLORIDE 600; 310; 30; 20 MG/100ML; MG/100ML; MG/100ML; MG/100ML
INJECTION, SOLUTION INTRAVENOUS CONTINUOUS PRN
Status: DISCONTINUED | OUTPATIENT
Start: 2022-09-20 | End: 2022-09-20

## 2022-09-20 RX ORDER — PROPOFOL 10 MG/ML
INJECTION, EMULSION INTRAVENOUS CONTINUOUS PRN
Status: DISCONTINUED | OUTPATIENT
Start: 2022-09-20 | End: 2022-09-20

## 2022-09-20 RX ORDER — OXYCODONE HYDROCHLORIDE 5 MG/1
5 TABLET ORAL
Status: COMPLETED | OUTPATIENT
Start: 2022-09-20 | End: 2022-09-20

## 2022-09-20 RX ADMIN — MIDAZOLAM 1 MG: 1 INJECTION INTRAMUSCULAR; INTRAVENOUS at 12:11

## 2022-09-20 RX ADMIN — DEXAMETHASONE SODIUM PHOSPHATE 4 MG: 4 INJECTION, SOLUTION INTRAMUSCULAR; INTRAVENOUS at 12:23

## 2022-09-20 RX ADMIN — ACETAMINOPHEN 975 MG: 325 TABLET, FILM COATED ORAL at 11:03

## 2022-09-20 RX ADMIN — LIDOCAINE HYDROCHLORIDE 40 MG: 20 INJECTION, SOLUTION INFILTRATION; PERINEURAL at 12:17

## 2022-09-20 RX ADMIN — ONDANSETRON 4 MG: 2 INJECTION INTRAMUSCULAR; INTRAVENOUS at 12:17

## 2022-09-20 RX ADMIN — DOXYCYCLINE 200 MG: 100 INJECTION, POWDER, LYOPHILIZED, FOR SOLUTION INTRAVENOUS at 11:55

## 2022-09-20 RX ADMIN — SODIUM CHLORIDE, POTASSIUM CHLORIDE, SODIUM LACTATE AND CALCIUM CHLORIDE: 600; 310; 30; 20 INJECTION, SOLUTION INTRAVENOUS at 12:04

## 2022-09-20 RX ADMIN — DEXAMETHASONE SODIUM PHOSPHATE 4 MG: 4 INJECTION, SOLUTION INTRAMUSCULAR; INTRAVENOUS at 12:20

## 2022-09-20 RX ADMIN — IBUPROFEN 800 MG: 400 TABLET, FILM COATED ORAL at 13:43

## 2022-09-20 RX ADMIN — PROPOFOL 50 MG: 10 INJECTION, EMULSION INTRAVENOUS at 12:17

## 2022-09-20 RX ADMIN — MIDAZOLAM 2 MG: 1 INJECTION INTRAMUSCULAR; INTRAVENOUS at 12:06

## 2022-09-20 RX ADMIN — PROPOFOL 175 MCG/KG/MIN: 10 INJECTION, EMULSION INTRAVENOUS at 12:17

## 2022-09-20 RX ADMIN — OXYCODONE HYDROCHLORIDE 5 MG: 5 TABLET ORAL at 14:01

## 2022-09-20 ASSESSMENT — ACTIVITIES OF DAILY LIVING (ADL)
ADLS_ACUITY_SCORE: 35

## 2022-09-20 NOTE — ANESTHESIA CARE TRANSFER NOTE
Patient: Rachel Ryder    Procedure: Procedure(s):  DILATION AND EVACUATION, UTERUS       Diagnosis: Termination of pregnancy (fetus) [Z33.2]  Diagnosis Additional Information: No value filed.    Anesthesia Type:   MAC     Note:    Oropharynx: oropharynx clear of all foreign objects and spontaneously breathing  Level of Consciousness: awake and drowsy  Oxygen Supplementation: face mask  Level of Supplemental Oxygen (L/min / FiO2): 6  Independent Airway: airway patency satisfactory and stable  Dentition: dentition unchanged  Vital Signs Stable: post-procedure vital signs reviewed and stable  Report to RN Given: handoff report given  Patient transferred to: Phase II    Handoff Report: Identifed the Patient, Identified the Reponsible Provider, Reviewed the pertinent medical history, Discussed the surgical course, Reviewed Intra-OP anesthesia mangement and issues during anesthesia, Set expectations for post-procedure period and Allowed opportunity for questions and acknowledgement of understanding      Vitals:  Vitals Value Taken Time   /58 09/20/22 1307   Temp     Pulse 68 09/20/22 1307   Resp     SpO2 99 % 09/20/22 1309   Vitals shown include unvalidated device data.    Electronically Signed By: Ashley M. Mulvihill, APRN CRNA  September 20, 2022  1:09 PM

## 2022-09-20 NOTE — H&P
Preoperative History & Physical     Name:            Rachel Ryder  YOB: 1985      Date of Surgery: 2022    SUBJECTIVE:     Rachel Ryder is a 37 year old  at 16w0d on day of procedure by LMP c/w 8w2d ultrasound who desires termination of pregnancy in the setting of Trisomy 21 diagnosis (confirmed via amniocentesis with MFM 2022).     Today, the patient reports feeling physically well without systemic complaints. States she took misoprostol at 0930 and has been feeling some cramping over the last half hour.    OB History    Para Term  AB Living   2 1 1 0 0 1   SAB IAB Ectopic Multiple Live Births   0 0 0 0 1      # Outcome Date GA Lbr [place]ota/2nd Weight Sex Delivery Anes PTL Lv   2 Current            1 Term         YAMIL        Gynecologic History:   Patient's last menstrual period was 2022.  Denies history of abnormal paps, last NILM 2019.  Denies history of STIs     History reviewed. No pertinent past medical history. No history of asthma or HTN.    History reviewed. No pertinent surgical history. No abdominal/GYN surgeries.    Medications:  Current Facility-Administered Medications   Medication     acetaminophen (TYLENOL) tablet 975 mg     doxycycline (VIBRAMYCIN) 200 mg in sodium chloride 0.9 % 100 mL intermittent infusion     Provider ordered ALTERNATE pre op antibiotic.       Allergies:   Allergies   Allergen Reactions     Ceclor [Cefaclor]        Social History:  Social History     Tobacco Use     Smoking status: Never Smoker     Smokeless tobacco: Never Used   Substance Use Topics     Alcohol use: Not Currently     Drug use: Never     ROS:  Significant for abdominal cramping s/p misoprostol as above.  All other systems reviewed and are negative.    OBJECTIVE:     /73   Pulse 75   Temp 98.2  F (36.8  C) (Oral)   Resp 18   Wt 68.1 kg (150 lb 2.1 oz)   LMP 2022   SpO2 100%  There is no height or weight on file to calculate BMI.  General:   Alert, no distress   HEENT:  Normocephalic, without obvious abnormality   Lungs:  Clear to auscultation bilaterally   Heart:  Regular rate and rhythm, no murmur   Abdomen:  Soft, non-tender, non-distended, bowel sounds normal.    Pelvic: Deferred until OR.   Extremities: normal   Psychiatric  Normal orientation, mood and affect     Blood Type: Rh negative, received Rhogam 2022 with amniocentesis procedure    DILATORS:  None.    ASSESSMENT:     37 year old female  at 16w0d on day of procedure by LMP c/w 8w2d ultrasound who desires termination of pregnancy in the setting of Trisomy 21 diagnosis. Risks & details of dilation and evacuation reviewed: pain, bleeding requiring transfusion, infection, and uterine perforation. All of her questions were answered and consent form signed.    PLAN:     -- Proceed to OR for D&E   Women's Right to Know Completed: 2022  Mifepristone: Not indicated  Misoprostol: Administered outpatient 1-2 hours pre-op (patient took at 0930 this AM)   Pathology Exam: Routine   Karyotype/Microarray: N/A - diagnosis of Trisomy 21 already confirmed on NIPT, amniocentesis   Remains Disposition: Hospital per patient preference   Will offer Social Work consult for social support.  -- Infection: routine antibiotic prophylaxis with doxycycline  -- Discussed future pregnancies: Yes and advised if desires pregnancy to wait till 1st normal menses in approximately 2-3mths. Will continue taking a prenatal vitamin daily.  -- Contraception: The patient declines contraception at this time.    Michelle Cueva MD, PGY-2  11:04 AM  [facility] Obstetrics & Gynecology Residency

## 2022-09-20 NOTE — DISCHARGE INSTRUCTIONS
A Dilation and evacuation procedure, like other surgical procedures, does have a risk of side effects or complications. Although these complications are not common, it is important that you know what to expect after surgery. Most people do not need a postoperative visit to assess physical recovery because they do well. Sometimes patients benefit from a follow up visit for other reasons. Let us know if you would like a follow up visit for any problems or concerns.     If you have any problems or questions, you can contact a doctor at any time at [phone]. If you cannot reach a doctor at this number and it is an emergency, you can call the [facility] Emergency Department at [phone].    Infection   Infection is an uncommon complication, occurring in less than 1 patient in 100. It is usually associated with a fever (100.4 degrees or higher), and sometimes with abdominal cramps or foul smelling discharge from the vagina. It can nearly always be treated simply and effectively with antibiotics.  Take your temperature with a thermometer if you feel warm, have chills, or feel generally ill. If your temperature is 100.4 degrees or higher for two times in a row, taken 4 hours apart, or 101 degrees or higher even once, call the office. The fever may indicate infection and it is urgent that you report it at once so that the doctor can decide if you need treatment.     Bleeding  Very heavy bleeding is uncommon. The normal amount of bleeding will vary from patient to patient. Some may have very little bleeding or no bleeding at all. Most commonly, women begin bleeding the day of the procedure, and then bleed for 7-14 days. Some women can have light bleeding or spotting that lasts until they start their next period. You may pass clots and bleeding may seem to increase when you get up suddenly or go to the toilet. Do not be alarmed or frightened if you pass any clots. This is normal after a D&E procedure. If  the amount of bleeding increases so that you are completely soaking through one or more pads per hour, call the clinic.    Cramping  You will probably experience cramping for a few days that is similar to cramping with a menstrual period. Cramping is normal and comes from the uterus trying to get small again; it has to contract down to its normal size, which is smaller than your fist. You should be able to obtain relief by taking Advil, Motrin, or any generic brand of ibuprofen; take 600mg (3 of the over-the-counter tablets) every 6 hours as needed for pain. You can also use a heating pad or hot water bottle if necessary. You may use 2 Extra-Strength Tylenol every 8 hours as needed if you are allergic to ibuprofen, or in addition to ibuprofen if your pain is not relieved by ibuprofen alone. If the cramping is severe or prolonged, and you are not getting relief from any of these medicines, please call the clinic.    After Anesthesia Instructions  You should not drive for 24 hours after receiving sedation. A responsible adult should be with you for 24 hours after the procedure.    Activity  Gradually increase your activity as you feel able. Sometimes, with strenuous activity (like heavy lifting and bending), your bleeding may increase. This doesn't happen for everyone. If you notice that your bleeding increases with strenuous activity, then avoid these activities or do them less frequently for a few days.      Pad/Tampon Use  Pads should be used for the first few days after your procedure. The rate of bleeding can be observed more easily when pads are used. Tampons can be used once the bleeding has slowed down or you have only light bleeding or spotting.     Return of Periods  For women who are not using hormonal contraception (for example, birth control pills, a hormonal IUD or implant, or Depo-Provera) your first period would be expected 4-8 weeks after surgery. The first one or two may be a bit heavier than your  baseline.    Breast Care  People who have a D&E will sometimes notice their breasts filling with milk after the procedure. This usually happens within 2-5 days after surgery. If your breasts do fill with milk, avoid direct heat and other stimulation to the breasts. Some people use cold cabbage leaves or ice bags in their bra or medications like pseudoephedrine (Sudafed) to assist with stopping milk production.     Bathing  Wait 1 week to take a bath. You may shower at any time. Do not douche.    Nutrition  There is no restriction on your diet. You may eat and drink normally.    Sexual [place]  You should not have intercourse for 1 week after your procedure. You should use the contraceptive method that you discussed with the doctor if you don't have plans to be pregnant again in the near future.    Other Healing  For some people, the decision to have a D&E is an easy one. For others, it is much more difficult or stressful. For most, it falls somewhere in between. There are many ways to care for yourself after D&E, physically, mentally, and emotionally. It is not uncommon to feel conflicting feelings, at the same time, such as relief, grief, sadness, happiness, empowerment, anxiety, or guilt. There is no right or wrong way to feel. However, if you have feelings of distress to not go away, a trained, unbiased counselor or psychologist can help. We have two that work with us at the [facility] that we can recommend or you can find one. Other resources for healing:    All Options (8-497-043-6587 // www.all-options.org) is a toll-free talk like for pregnancy, parenting, , and adoption.   [employer] (1-198.202.9400 // www.[employer].org) is a national organization that addressed the emotional health of people after D&E.    [employer] (1-289.316.8704 // www.faithaloud.org) provides spiritual counseling after ending a pregnancy.       Same-Day Surgery   Adult Discharge Orders &  Instructions     For 24 hours after surgery:  Get plenty of rest.  A responsible adult must stay with you for at least 24 hours after you leave the hospital.   Pain medication can slow your reflexes. Do not drive or use heavy equipment.  If you have weakness or tingling, don't drive or use heavy equipment until this feeling goes away.  Mixing alcohol and pain medication can cause dizziness and slow your breathing. It can even be fatal. Do not drink alcohol while taking pain medication.  Avoid strenuous or risky activities.  Ask for help when climbing stairs.   You may feel lightheaded.  If so, sit for a few minutes before standing.  Have someone help you get up.   If you have nausea (feel sick to your stomach), drink only clear liquids such as apple juice, ginger ale, broth or 7-Up.  Rest may also help.  Be sure to drink enough fluids.  Move to a regular diet as you feel able. Take pain medications with a small amount of solid food, such as toast or crackers, to avoid nausea.   A slight fever is normal. Call the doctor if your fever is over 100 F (37.7 C) (taken under the tongue) or lasts longer than 24 hours.  You may have a dry mouth, muscle aches, trouble sleeping or a sore throat.  These symptoms should go away after 24 hours.  Do not make important or legal decisions.   Pain Management:      1. Take pain medication (if prescribed) for pain as directed by your physician.        2. WARNING: If the pain medication you have been prescribed contains Tylenol  (acetaminophen), DO NOT take additional doses of Tylenol (acetaminophen).     Call your doctor for any of the followin.  Signs of infection (fever, growing tenderness at the surgery site, severe pain, a large amount of drainage or bleeding, foul-smelling drainage, redness, swelling).    2.  It has been over 8 to 10 hours since surgery and you are still not able to urinate (pee).    3.  Headache for over 24 hours.    4.  Numbness, tingling or weakness the  day after surgery (if you had spinal anesthesia).  To contact a doctor, call Dr. Watkins, [facility] at [phone] or:  '   [phone] and ask for the Resident On Call for:          __________________________________________ (answered 24 hours a day)  '   Emergency Department:  [place] [facility]: [phone]  [place] [facility] Department: [phone]

## 2022-09-20 NOTE — CONSULTS
SW acknowledges consult and met with parents. They are tearful and are not interested in talking about baby or resources. They do not want to talk to a SW. SW validated this and left. SW checked in with RN and left pager number in case they have questions after.      ROSE Bridges, [facility]  Maternal and Child Health   Office: [phone]  Pager: [phone]  After Hours Pager: [phone]  [email or web address]

## 2022-09-20 NOTE — ANESTHESIA POSTPROCEDURE EVALUATION
Patient: Rachel Ryder    Procedure: Procedure(s):  DILATION AND EVACUATION, UTERUS       Anesthesia Type:  MAC    Note:  Disposition: Outpatient   Postop Pain Control: Uneventful            Sign Out: Well controlled pain   PONV: No   Neuro/Psych: Uneventful            Sign Out: Acceptable/Baseline neuro status   Airway/Respiratory: Uneventful            Sign Out: Acceptable/Baseline resp. status   CV/Hemodynamics: Uneventful            Sign Out: Acceptable CV status; No obvious hypovolemia; No obvious fluid overload   Other NRE:    DID A NON-ROUTINE EVENT OCCUR?            Last vitals:  Vitals Value Taken Time   /75 09/20/22 1500   Temp 36.8  C (98.2  F) 09/20/22 1308   Pulse 67 09/20/22 1500   Resp 16 09/20/22 1330   SpO2 96 % 09/20/22 1505   Vitals shown include unvalidated device data.    Electronically Signed By: Lili Melton MD  September 20, 2022  3:07 PM

## 2022-09-20 NOTE — OP NOTE
Operative Note    Preoperative diagnosis:  16w0d pregnancy with Trisomy 21  Postoperative diagnosis:  Same as above, s/p below stated procedure  Procedure:  Ultrasound guided dilation and evacuation  Surgeon:  Dr. Geno Watkins MD  Assistant: Michelle Cueva MD PGY-2  Anesthesia:  MAC, Paracervical block  Complications: None apparent  Specimens:  Uterine contents  Condition:  Stable to PACU    EBL:  100 mL  IVF:  600 mL, crystalloid  UOP:  Patient voided prior to procedure    Findings:  Anteverted uterus, midline, and measuring 16-week size.  Normal appearing cervix with os dilated a fingertip amount on palpation of external os (internal os seen to be opening on initial US).  Return to tissue with passage.  Gritty texture appreciated throughout uterus and no ongoing bleeding from the cervical os at the conclusion of the procedure.  Thin endometrial stripe on ultrasound and inspection of uterine contents confirmed completion of procedure.    Indication:  Rachel Ryder is a 37 year old  at 16w0d with confirmation of Trisomy 21 diagnosis via NIPT and amniocentesis opting for termination of pregnancy.  Risks, benefits, and alternatives to the procedure were discussed with the patient who elected to proceed.  All questions were answered and an informed consent was obtained.  She is Rh negative and recently received Rhogam on 2022 at the time of amniocentesis.  She declines contraception at this time.    Procedure:  The patient was taken to the operating room where she underwent moderate sedation under monitored anesthesia care without difficulty.  She was placed in a dorsal lithotomy position using [place] stirrups.  Antibiotic prophylaxis (doxycycline 200 mg IV) was administered prior to the start of the procedure.  The patient was examined for the above noted findings and then prepped and draped in the usual sterile fashion.  A speculum was inserted into the vagina and the findings noted above were seen.   A tenaculum was placed vertically on the anterior cervical lip.  A total of 20 mL of 1% lidocaine with vasopressin was injected into the 4 o'clock and 8 o'clock positions of the cervicovaginal junction.  Abdominal ultrasound probe was placed to visualize dilator and curette insertion into the endometrial cavity during the entire procedure.  The endocervical canal was serially dilated to 49 Estonian using Hernadez dilators.  The suction device was then activated and a size 12 suction curette was placed into the cervical canal.  The curette was rotated to clear the uterus of amniotic fluid.  There was also return of a small amount of tissue.  Several more passes were then made with Sopher and Rohith forceps under direct visualization of ultrasound with removal of tissue with each pass.  The suction curette was re-introduced and the endometrial cavity cleared until there were no visible products seen on ultrasound.  All instruments were then removed.  The tenaculum was removed from the cervix.  The tenaculum site was then noted to be hemostatic.  There was minimal bleeding coming from the cervical os at the end of the procedure.  The patient was repositioned to the supine position.  The patient tolerated the procedure well and was taken to the recovery room in stable condition.  Dr. Watkins was present for the entire procedure.    Michelle Cueva MD, PGY-2  1:05 PM  [facility] Obstetrics & Gynecology Residency

## 2022-09-20 NOTE — ANESTHESIA PREPROCEDURE EVALUATION
Anesthesia Pre-Procedure Evaluation    Patient: Rachel Ryder   MRN: [id number] : 1985        Procedure : Procedure(s):  DILATION AND EVACUATION, UTERUS          No past medical history on file.   No past surgical history on file.   Allergies   Allergen Reactions     Ceclor [Cefaclor]       Social History     Tobacco Use     Smoking status: Never Smoker     Smokeless tobacco: Never Used   Substance Use Topics     Alcohol use: Not Currently      Wt Readings from Last 1 Encounters:   No data found for Wt        Anesthesia Evaluation   Pt has had prior anesthetic. Type: MAC.        ROS/MED HX  ENT/Pulmonary:  - neg pulmonary ROS     Neurologic:  - neg neurologic ROS     Cardiovascular:  - neg cardiovascular ROS     METS/Exercise Tolerance:     Hematologic:  - neg hematologic  ROS     Musculoskeletal:  - neg musculoskeletal ROS     GI/Hepatic:  - neg GI/hepatic ROS     Renal/Genitourinary:  - neg Renal ROS     Endo:  - neg endo ROS     Psychiatric/Substance Use:  - neg psychiatric ROS     Infectious Disease:  - neg infectious disease ROS     Malignancy:  - neg malignancy ROS     Other:            Physical Exam    Airway        Mallampati: II   TM distance: > 3 FB   Neck ROM: full   Mouth opening: > 3 cm    Respiratory Devices and Support         Dental  no notable dental history         Cardiovascular   cardiovascular exam normal          Pulmonary   pulmonary exam normal                OUTSIDE LABS:  CBC:   Lab Results   Component Value Date    WBC 12.3 (H) 2020    WBC 10.0 2005    HGB 10.9 (L) 2020    HGB 14.4 2005    HCT 31.4 (L) 2020    HCT 42.7 2005     2020     2005     BMP:   Lab Results   Component Value Date     2005    POTASSIUM 3.7 2005    CHLORIDE 101 2005    CO2 26 2005    BUN 20 2005    CR 0.91 2005     (H) 2005     COAGS: No results found for: PTT, INR, FIBR  POC:   Lab Results    Component Value Date    HCG Negative 12/14/2005     HEPATIC:   Lab Results   Component Value Date    ALBUMIN 4.5 12/14/2005    PROTTOTAL 8.7 (H) 12/14/2005    ALT 24 06/02/2020    AST 20 06/02/2020    ALKPHOS 84 12/14/2005    BILITOTAL 1.2 12/14/2005     OTHER:   Lab Results   Component Value Date    NELLY 9.6 12/14/2005    LIPASE 43 12/14/2005    AMYLASE 39 12/14/2005       Anesthesia Plan    ASA Status:  2   NPO Status:  NPO Appropriate    Anesthesia Type: MAC.              Consents    Anesthesia Plan(s) and associated risks, benefits, and realistic alternatives discussed. Questions answered and patient/representative(s) expressed understanding.    - Discussed:     - Discussed with:  Patient         Postoperative Care       PONV prophylaxis: Ondansetron (or other 5HT-3)     Comments:    Other Comments: MAC with std monitors            Lili Melton MD

## 2022-09-21 ENCOUNTER — TELEPHONE (OUTPATIENT)
Dept: OBGYN | Facility: CLINIC | Age: 37
End: 2022-09-21

## 2022-09-21 NOTE — TELEPHONE ENCOUNTER
----- Message from Geno Watkins MD sent at 9/20/2022  3:38 PM CDT -----  Regarding: followup after D&E  Hi team,    Rachel had a D&E with me today. Could you call her and offer her a post-op telephone visit on 9/27 during my [facility] clinic?       Thanks,    JR

## 2022-09-22 LAB
PATH REPORT.COMMENTS IMP SPEC: NORMAL
PATH REPORT.FINAL DX SPEC: NORMAL
PATH REPORT.GROSS SPEC: NORMAL
PATH REPORT.MICROSCOPIC SPEC OTHER STN: NORMAL
PATH REPORT.RELEVANT HX SPEC: NORMAL
PHOTO IMAGE: NORMAL

## 2022-09-22 PROCEDURE — 88304 TISSUE EXAM BY PATHOLOGIST: CPT | Mod: 26 | Performed by: PATHOLOGY

## 2022-09-26 LAB
INTERPRETATION: NORMAL
ISCN: NORMAL
METHODS: NORMAL

## 2022-09-26 PROCEDURE — 88291 CYTO/MOLECULAR REPORT: CPT | Performed by: MEDICAL GENETICS

## 2022-09-27 ENCOUNTER — VIRTUAL VISIT (OUTPATIENT)
Dept: OBGYN | Facility: CLINIC | Age: 37
End: 2022-09-27
Attending: OBSTETRICS & GYNECOLOGY
Payer: COMMERCIAL

## 2022-09-27 ENCOUNTER — TELEPHONE (OUTPATIENT)
Dept: MATERNAL FETAL MEDICINE | Facility: CLINIC | Age: 37
End: 2022-09-27

## 2022-09-27 DIAGNOSIS — Z98.890 POSTOPERATIVE STATE: Primary | ICD-10-CM

## 2022-09-27 PROCEDURE — 99024 POSTOP FOLLOW-UP VISIT: CPT | Performed by: OBSTETRICS & GYNECOLOGY

## 2022-09-27 NOTE — TELEPHONE ENCOUNTER
September 27, 2022    Plan for 11am phone call on Friday 9/30 to review chromosome analysis results per Jemima's request.     Roz Chowdhury MS, [facility]  Licensed Genetic Counselor  [facility]  Maternal Fetal Medicine  Ph: [phone]  [email or web address]

## 2022-09-27 NOTE — LETTER
2022       RE: Rachel Ryder  [address]  [place] [address]     Dear Colleague,    Thank you for referring your patient, Rachel Ryder, to the [facility] [place] [facility]. Please see a copy of my visit note below.    [facility]  Gynecology Problem Telephone Visit    Rachel Ryder is a 37 year old female who is being evaluated via a billable telephone visit.    Patient opted to conduct today's return visit via telephone secondary to the COVID-19 pandemic vs. an in person visit to the clinic.    I spoke with: Rachel Ryder    SUBJECTIVE    Rachel Ryder is a 37 year old  who is here for a postoperative visit. She had a D&E on 22. Since surgery, she notes vaginal bleeding like a period. Cramping has mostly resolved, doesn't need ibuprofen or tylenol. Wonders if it is OK to exercise.    Her LMP is: Patient's last menstrual period was 2022.    PAST MEDICAL HISTORY  No past medical history on file.    MEDICATIONS  Current Outpatient Medications   Medication     acetaminophen (TYLENOL) 325 MG tablet     ibuprofen (ADVIL/MOTRIN) 200 MG tablet     No current facility-administered medications for this visit.       ALLERGIES  Allergies   Allergen Reactions     Ceclor [Cefaclor]        REVIEW OF SYSTEMS  A 10 point review of systems including Constitutional, Eyes, Respiratory, Cardiovascular, Gastroenterology, Genitourinary, Integumentary, Musculoskeletal, and Psychiatric, were all negative, except for pertinent positives noted in the above HPI.    OBJECTIVE  SURGICAL PATHOLOGY:  Products of conception, dilatation and evacuation:    fetal and placental parts.    16 weeks 0 days gestation by dates.    16-17 weeks gestation by foot length.    no gross anomalies seen.    no histopathologic changes in placental or fetal tissue sampled.      ASSESSMENT  Rachel Ryder is a 37 year old  who is 1 week  "post D&E, evaluated via telephone visit.    -no need for further activity restrictions. Discussed return to fertility and to continue a prenatal vitamin. Questions answered.    The patient was notified of following prior to conducting the telephone visit:   \"This telephone visit will be conducted via a call between you and your physician/provider. We have found that certain health care needs can be provided without the need for a physical exam. This service lets us provide the care you need with a short phone conversation. If a prescription is necessary we can send it directly to your pharmacy. If lab work is needed we can place an order for that and you can then stop by our lab to have the test done at a later time. If during the course of the call the physician/provider feels a telephone visit is not appropriate, you will not be charged for this service.\"     Phone call start: 0925  Phone call end: 0931  Phone call duration:  6 minutes    Geno Watkins MD, MSCI    [facility]/OBGYN  September 27, 2022    "

## 2022-09-27 NOTE — TELEPHONE ENCOUNTER
September 27, 2022    Email sent to Rachel and Ty per their request to inform them that final chromosome analysis result is available for review whenever they are ready. Provided my direct contact information.     Roz Chowdhury MS, [facility]  Licensed Genetic Counselor  [facility]  Maternal Fetal Medicine  Ph: [phone]  [email or web address]

## 2022-09-27 NOTE — PROGRESS NOTES
"[facility]  Gynecology Problem Telephone Visit    Rachel Ryder is a 37 year old female who is being evaluated via a billable telephone visit.    Patient opted to conduct today's return visit via telephone secondary to the COVID-19 pandemic vs. an in person visit to the clinic.    I spoke with: Rachel Ryder    SUBJECTIVE    Rachel Ryder is a 37 year old  who is here for a postoperative visit. She had a D&E on 22. Since surgery, she notes vaginal bleeding like a period. Cramping has mostly resolved, doesn't need ibuprofen or tylenol. Wonders if it is OK to exercise.    Her LMP is: Patient's last menstrual period was 2022.    PAST MEDICAL HISTORY  No past medical history on file.    MEDICATIONS  Current Outpatient Medications   Medication     acetaminophen (TYLENOL) 325 MG tablet     ibuprofen (ADVIL/MOTRIN) 200 MG tablet     No current facility-administered medications for this visit.       ALLERGIES  Allergies   Allergen Reactions     Ceclor [Cefaclor]        REVIEW OF SYSTEMS  A 10 point review of systems including Constitutional, Eyes, Respiratory, Cardiovascular, Gastroenterology, Genitourinary, Integumentary, Musculoskeletal, and Psychiatric, were all negative, except for pertinent positives noted in the above HPI.    OBJECTIVE  SURGICAL PATHOLOGY:  Products of conception, dilatation and evacuation:    fetal and placental parts.    16 weeks 0 days gestation by dates.    16-17 weeks gestation by foot length.    no gross anomalies seen.    no histopathologic changes in placental or fetal tissue sampled.      ASSESSMENT  Rachel Ryder is a 37 year old  who is 1 week post D&E, evaluated via telephone visit.    -no need for further activity restrictions. Discussed return to fertility and to continue a prenatal vitamin. Questions answered.    The patient was notified of following prior to conducting the telephone visit:   \"This telephone visit will be conducted via a call " "between you and your physician/provider. We have found that certain health care needs can be provided without the need for a physical exam. This service lets us provide the care you need with a short phone conversation. If a prescription is necessary we can send it directly to your pharmacy. If lab work is needed we can place an order for that and you can then stop by our lab to have the test done at a later time. If during the course of the call the physician/provider feels a telephone visit is not appropriate, you will not be charged for this service.\"     Phone call start: 0925  Phone call end: 0931  Phone call duration:  6 minutes    Geno Watkins MD, MSCI    [facility]OB[facility]  September 27, 2022  "

## 2022-09-30 NOTE — TELEPHONE ENCOUNTER
September 30, 2022    I spoke with Rachel and Ty to review available final chromosome analysis results from her amniocentesis procedure.     Chromosome analysis revealed 47,XY,+21 consistent with a clinical diagnosis of trisomy 21/ Down syndrome. 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     We reviewed that trisomy 21 due to nondisjunction has a low recurrence risk (estimated ~1% increase over age related risk for future pregnancies).     We reviewed availability of NIPT at 10 weeks in any future pregnancy, NT ultrasound between [id number], and level II comprehensive ultrasound. Diagnostic testing would also be available.     Rachel had a D&E on 9/20 and reports that she is physically recovering well. They have my direct contact information and were encouraged to reach out with any questions moving forward.     Roz Chowdhury MS, [facility]  Licensed Genetic Counselor  [facility]  Maternal Fetal Medicine  Ph: [phone]  [email or web address]

## 2022-11-30 ENCOUNTER — ANESTHESIA (OUTPATIENT)
Dept: SURGERY | Facility: CLINIC | Age: 37
End: 2022-11-30
Payer: COMMERCIAL

## 2022-11-30 ENCOUNTER — ANESTHESIA EVENT (OUTPATIENT)
Dept: SURGERY | Facility: CLINIC | Age: 37
End: 2022-11-30
Payer: COMMERCIAL

## 2022-11-30 ENCOUNTER — HOSPITAL ENCOUNTER (OUTPATIENT)
Facility: CLINIC | Age: 37
Discharge: HOME | End: 2022-11-30
Attending: OBSTETRICS & GYNECOLOGY | Admitting: OBSTETRICS & GYNECOLOGY
Payer: COMMERCIAL

## 2022-11-30 VITALS
TEMPERATURE: 98.2 F | RESPIRATION RATE: 18 BRPM | WEIGHT: 145.9 LBS | HEART RATE: 71 BPM | DIASTOLIC BLOOD PRESSURE: 59 MMHG | OXYGEN SATURATION: 99 % | SYSTOLIC BLOOD PRESSURE: 97 MMHG

## 2022-11-30 DIAGNOSIS — O00.90 RUPTURED ECTOPIC PREGNANCY: Primary | ICD-10-CM

## 2022-11-30 PROBLEM — Z98.890 H/O LAPAROSCOPY: Status: ACTIVE | Noted: 2022-11-30

## 2022-11-30 LAB
ABO/RH(D): NORMAL
ANTIBODY SCREEN: NEGATIVE
ERYTHROCYTE [DISTWIDTH] IN BLOOD BY AUTOMATED COUNT: 11.9 % (ref 10–15)
HCT VFR BLD AUTO: 36.6 % (ref 35–47)
HGB BLD-MCNC: 12.3 G/DL (ref 11.7–15.7)
HOLD SPECIMEN: NORMAL
MCH RBC QN AUTO: 30.9 PG (ref 26.5–33)
MCHC RBC AUTO-ENTMCNC: 33.6 G/DL (ref 31.5–36.5)
MCV RBC AUTO: 92 FL (ref 78–100)
PLATELET # BLD AUTO: 198 10E3/UL (ref 150–450)
RBC # BLD AUTO: 3.98 10E6/UL (ref 3.8–5.2)
SARS-COV-2 RNA RESP QL NAA+PROBE: NEGATIVE
SPECIMEN EXPIRATION DATE: NORMAL
WBC # BLD AUTO: 5.8 10E3/UL (ref 4–11)

## 2022-11-30 PROCEDURE — 88305 TISSUE EXAM BY PATHOLOGIST: CPT | Mod: TC | Performed by: OBSTETRICS & GYNECOLOGY

## 2022-11-30 PROCEDURE — 258N000003 HC RX IP 258 OP 636

## 2022-11-30 PROCEDURE — 36415 COLL VENOUS BLD VENIPUNCTURE: CPT | Performed by: DENTIST

## 2022-11-30 PROCEDURE — 999N000141 HC STATISTIC PRE-PROCEDURE NURSING ASSESSMENT: Performed by: OBSTETRICS & GYNECOLOGY

## 2022-11-30 PROCEDURE — 250N000013 HC RX MED GY IP 250 OP 250 PS 637: Performed by: ANESTHESIOLOGY

## 2022-11-30 PROCEDURE — 250N000009 HC RX 250

## 2022-11-30 PROCEDURE — 710N000009 HC RECOVERY PHASE 1, LEVEL 1, PER MIN: Performed by: OBSTETRICS & GYNECOLOGY

## 2022-11-30 PROCEDURE — 86850 RBC ANTIBODY SCREEN: CPT | Performed by: DENTIST

## 2022-11-30 PROCEDURE — 250N000011 HC RX IP 250 OP 636

## 2022-11-30 PROCEDURE — 272N000001 HC OR GENERAL SUPPLY STERILE: Performed by: OBSTETRICS & GYNECOLOGY

## 2022-11-30 PROCEDURE — 258N000001 HC RX 258: Performed by: OBSTETRICS & GYNECOLOGY

## 2022-11-30 PROCEDURE — 360N000076 HC SURGERY LEVEL 3, PER MIN: Performed by: OBSTETRICS & GYNECOLOGY

## 2022-11-30 PROCEDURE — 710N000012 HC RECOVERY PHASE 2, PER MINUTE: Performed by: OBSTETRICS & GYNECOLOGY

## 2022-11-30 PROCEDURE — 370N000017 HC ANESTHESIA TECHNICAL FEE, PER MIN: Performed by: OBSTETRICS & GYNECOLOGY

## 2022-11-30 PROCEDURE — 250N000011 HC RX IP 250 OP 636: Performed by: OBSTETRICS & GYNECOLOGY

## 2022-11-30 PROCEDURE — 250N000011 HC RX IP 250 OP 636: Performed by: ANESTHESIOLOGY

## 2022-11-30 PROCEDURE — 258N000003 HC RX IP 258 OP 636: Performed by: DENTIST

## 2022-11-30 PROCEDURE — U0005 INFEC AGEN DETEC AMPLI PROBE: HCPCS | Performed by: DENTIST

## 2022-11-30 PROCEDURE — 250N000025 HC SEVOFLURANE, PER MIN: Performed by: OBSTETRICS & GYNECOLOGY

## 2022-11-30 PROCEDURE — 86901 BLOOD TYPING SEROLOGIC RH(D): CPT | Performed by: DENTIST

## 2022-11-30 PROCEDURE — P9045 ALBUMIN (HUMAN), 5%, 250 ML: HCPCS

## 2022-11-30 PROCEDURE — 85027 COMPLETE CBC AUTOMATED: CPT | Performed by: DENTIST

## 2022-11-30 RX ORDER — DEXAMETHASONE SODIUM PHOSPHATE 4 MG/ML
INJECTION, SOLUTION INTRA-ARTICULAR; INTRALESIONAL; INTRAMUSCULAR; INTRAVENOUS; SOFT TISSUE PRN
Status: DISCONTINUED | OUTPATIENT
Start: 2022-11-30 | End: 2022-11-30

## 2022-11-30 RX ORDER — ONDANSETRON 2 MG/ML
INJECTION INTRAMUSCULAR; INTRAVENOUS PRN
Status: DISCONTINUED | OUTPATIENT
Start: 2022-11-30 | End: 2022-11-30

## 2022-11-30 RX ORDER — SODIUM CHLORIDE, SODIUM LACTATE, POTASSIUM CHLORIDE, CALCIUM CHLORIDE 600; 310; 30; 20 MG/100ML; MG/100ML; MG/100ML; MG/100ML
INJECTION, SOLUTION INTRAVENOUS CONTINUOUS
Status: DISCONTINUED | OUTPATIENT
Start: 2022-11-30 | End: 2022-11-30 | Stop reason: HOSPADM

## 2022-11-30 RX ORDER — HYDROCODONE BITARTRATE AND ACETAMINOPHEN 5; 325 MG/1; MG/1
1-2 TABLET ORAL EVERY 4 HOURS PRN
Qty: 10 TABLET | Refills: 0 | Status: ON HOLD | OUTPATIENT
Start: 2022-11-30 | End: 2024-04-10

## 2022-11-30 RX ORDER — PROPOFOL 10 MG/ML
INJECTION, EMULSION INTRAVENOUS CONTINUOUS PRN
Status: DISCONTINUED | OUTPATIENT
Start: 2022-11-30 | End: 2022-11-30

## 2022-11-30 RX ORDER — ACETAMINOPHEN 325 MG/1
975 TABLET ORAL ONCE
Status: DISCONTINUED | OUTPATIENT
Start: 2022-11-30 | End: 2022-11-30 | Stop reason: HOSPADM

## 2022-11-30 RX ORDER — IBUPROFEN 800 MG/1
800 TABLET, FILM COATED ORAL EVERY 6 HOURS PRN
Qty: 30 TABLET | Refills: 0 | Status: ON HOLD | OUTPATIENT
Start: 2022-11-30 | End: 2024-04-10

## 2022-11-30 RX ORDER — PROPOFOL 10 MG/ML
INJECTION, EMULSION INTRAVENOUS PRN
Status: DISCONTINUED | OUTPATIENT
Start: 2022-11-30 | End: 2022-11-30

## 2022-11-30 RX ORDER — KETOROLAC TROMETHAMINE 30 MG/ML
INJECTION, SOLUTION INTRAMUSCULAR; INTRAVENOUS PRN
Status: DISCONTINUED | OUTPATIENT
Start: 2022-11-30 | End: 2022-11-30

## 2022-11-30 RX ORDER — LABETALOL HYDROCHLORIDE 5 MG/ML
10 INJECTION, SOLUTION INTRAVENOUS
Status: DISCONTINUED | OUTPATIENT
Start: 2022-11-30 | End: 2022-11-30 | Stop reason: HOSPADM

## 2022-11-30 RX ORDER — CEFAZOLIN SODIUM 1 G/3ML
INJECTION, POWDER, FOR SOLUTION INTRAMUSCULAR; INTRAVENOUS PRN
Status: DISCONTINUED | OUTPATIENT
Start: 2022-11-30 | End: 2022-11-30

## 2022-11-30 RX ORDER — LIDOCAINE HYDROCHLORIDE 20 MG/ML
INJECTION, SOLUTION INFILTRATION; PERINEURAL PRN
Status: DISCONTINUED | OUTPATIENT
Start: 2022-11-30 | End: 2022-11-30

## 2022-11-30 RX ORDER — FENTANYL CITRATE 50 UG/ML
INJECTION, SOLUTION INTRAMUSCULAR; INTRAVENOUS PRN
Status: DISCONTINUED | OUTPATIENT
Start: 2022-11-30 | End: 2022-11-30

## 2022-11-30 RX ORDER — HYDROCODONE BITARTRATE AND ACETAMINOPHEN 5; 325 MG/1; MG/1
1 TABLET ORAL EVERY 6 HOURS PRN
Status: DISCONTINUED | OUTPATIENT
Start: 2022-11-30 | End: 2022-11-30 | Stop reason: HOSPADM

## 2022-11-30 RX ORDER — HYDROMORPHONE HCL IN WATER/PF 6 MG/30 ML
0.4 PATIENT CONTROLLED ANALGESIA SYRINGE INTRAVENOUS EVERY 5 MIN PRN
Status: DISCONTINUED | OUTPATIENT
Start: 2022-11-30 | End: 2022-11-30 | Stop reason: HOSPADM

## 2022-11-30 RX ORDER — MEPERIDINE HYDROCHLORIDE 25 MG/ML
25 INJECTION INTRAMUSCULAR; INTRAVENOUS; SUBCUTANEOUS ONCE
Status: COMPLETED | OUTPATIENT
Start: 2022-11-30 | End: 2022-11-30

## 2022-11-30 RX ORDER — SODIUM CHLORIDE 9 MG/ML
INJECTION, SOLUTION INTRAVENOUS CONTINUOUS PRN
Status: DISCONTINUED | OUTPATIENT
Start: 2022-11-30 | End: 2022-11-30

## 2022-11-30 RX ORDER — FENTANYL CITRATE 0.05 MG/ML
50 INJECTION, SOLUTION INTRAMUSCULAR; INTRAVENOUS EVERY 5 MIN PRN
Status: DISCONTINUED | OUTPATIENT
Start: 2022-11-30 | End: 2022-11-30 | Stop reason: HOSPADM

## 2022-11-30 RX ORDER — HYDROMORPHONE HCL IN WATER/PF 6 MG/30 ML
0.2 PATIENT CONTROLLED ANALGESIA SYRINGE INTRAVENOUS EVERY 5 MIN PRN
Status: DISCONTINUED | OUTPATIENT
Start: 2022-11-30 | End: 2022-11-30 | Stop reason: HOSPADM

## 2022-11-30 RX ORDER — FENTANYL CITRATE 0.05 MG/ML
25 INJECTION, SOLUTION INTRAMUSCULAR; INTRAVENOUS EVERY 5 MIN PRN
Status: DISCONTINUED | OUTPATIENT
Start: 2022-11-30 | End: 2022-11-30 | Stop reason: HOSPADM

## 2022-11-30 RX ORDER — IBUPROFEN 400 MG/1
800 TABLET, FILM COATED ORAL ONCE
Status: DISCONTINUED | OUTPATIENT
Start: 2022-11-30 | End: 2022-11-30 | Stop reason: HOSPADM

## 2022-11-30 RX ORDER — ONDANSETRON 2 MG/ML
4 INJECTION INTRAMUSCULAR; INTRAVENOUS EVERY 30 MIN PRN
Status: DISCONTINUED | OUTPATIENT
Start: 2022-11-30 | End: 2022-11-30 | Stop reason: HOSPADM

## 2022-11-30 RX ORDER — ONDANSETRON 4 MG/1
4 TABLET, ORALLY DISINTEGRATING ORAL EVERY 30 MIN PRN
Status: DISCONTINUED | OUTPATIENT
Start: 2022-11-30 | End: 2022-11-30 | Stop reason: HOSPADM

## 2022-11-30 RX ORDER — DEXMEDETOMIDINE HYDROCHLORIDE 4 UG/ML
INJECTION, SOLUTION INTRAVENOUS PRN
Status: DISCONTINUED | OUTPATIENT
Start: 2022-11-30 | End: 2022-11-30

## 2022-11-30 RX ADMIN — PHENYLEPHRINE HYDROCHLORIDE 100 MCG: 10 INJECTION INTRAVENOUS at 15:56

## 2022-11-30 RX ADMIN — ALBUMIN HUMAN: 0.05 INJECTION, SOLUTION INTRAVENOUS at 15:34

## 2022-11-30 RX ADMIN — PHENYLEPHRINE HYDROCHLORIDE 100 MCG: 10 INJECTION INTRAVENOUS at 16:02

## 2022-11-30 RX ADMIN — HUMAN RHO(D) IMMUNE GLOBULIN 300 MCG: 1500 SOLUTION INTRAMUSCULAR; INTRAVENOUS at 17:47

## 2022-11-30 RX ADMIN — SODIUM CHLORIDE: 9 INJECTION, SOLUTION INTRAVENOUS at 15:16

## 2022-11-30 RX ADMIN — PHENYLEPHRINE HYDROCHLORIDE 100 MCG: 10 INJECTION INTRAVENOUS at 15:34

## 2022-11-30 RX ADMIN — PHENYLEPHRINE HYDROCHLORIDE 100 MCG: 10 INJECTION INTRAVENOUS at 15:28

## 2022-11-30 RX ADMIN — ONDANSETRON 4 MG: 2 INJECTION INTRAMUSCULAR; INTRAVENOUS at 15:54

## 2022-11-30 RX ADMIN — PHENYLEPHRINE HYDROCHLORIDE 100 MCG: 10 INJECTION INTRAVENOUS at 15:14

## 2022-11-30 RX ADMIN — PROPOFOL 30 MCG/KG/MIN: 10 INJECTION, EMULSION INTRAVENOUS at 15:16

## 2022-11-30 RX ADMIN — HYDROCODONE BITARTRATE AND ACETAMINOPHEN 1 TABLET: 5; 325 TABLET ORAL at 17:40

## 2022-11-30 RX ADMIN — DEXMEDETOMIDINE HYDROCHLORIDE 8 MCG: 200 INJECTION INTRAVENOUS at 16:21

## 2022-11-30 RX ADMIN — DEXAMETHASONE SODIUM PHOSPHATE 4 MG: 4 INJECTION, SOLUTION INTRA-ARTICULAR; INTRALESIONAL; INTRAMUSCULAR; INTRAVENOUS; SOFT TISSUE at 15:21

## 2022-11-30 RX ADMIN — PHENYLEPHRINE HYDROCHLORIDE 100 MCG: 10 INJECTION INTRAVENOUS at 15:47

## 2022-11-30 RX ADMIN — ROCURONIUM BROMIDE 10 MG: 50 INJECTION, SOLUTION INTRAVENOUS at 15:49

## 2022-11-30 RX ADMIN — PHENYLEPHRINE HYDROCHLORIDE 100 MCG: 10 INJECTION INTRAVENOUS at 16:08

## 2022-11-30 RX ADMIN — ROCURONIUM BROMIDE 40 MG: 50 INJECTION, SOLUTION INTRAVENOUS at 15:20

## 2022-11-30 RX ADMIN — PHENYLEPHRINE HYDROCHLORIDE 100 MCG: 10 INJECTION INTRAVENOUS at 15:18

## 2022-11-30 RX ADMIN — PHENYLEPHRINE HYDROCHLORIDE 100 MCG: 10 INJECTION INTRAVENOUS at 15:44

## 2022-11-30 RX ADMIN — MEPERIDINE HYDROCHLORIDE 25 MG: 25 INJECTION INTRAMUSCULAR; INTRAVENOUS; SUBCUTANEOUS at 16:42

## 2022-11-30 RX ADMIN — CEFAZOLIN 2 G: 1 INJECTION, POWDER, FOR SOLUTION INTRAMUSCULAR; INTRAVENOUS at 15:14

## 2022-11-30 RX ADMIN — ROCURONIUM BROMIDE 10 MG: 50 INJECTION, SOLUTION INTRAVENOUS at 15:29

## 2022-11-30 RX ADMIN — FENTANYL CITRATE 25 MCG: 50 INJECTION, SOLUTION INTRAMUSCULAR; INTRAVENOUS at 17:10

## 2022-11-30 RX ADMIN — PROPOFOL 200 MG: 10 INJECTION, EMULSION INTRAVENOUS at 15:09

## 2022-11-30 RX ADMIN — LIDOCAINE HYDROCHLORIDE 80 MG: 20 INJECTION, SOLUTION INFILTRATION; PERINEURAL at 15:09

## 2022-11-30 RX ADMIN — MIDAZOLAM 1 MG: 1 INJECTION INTRAMUSCULAR; INTRAVENOUS at 15:05

## 2022-11-30 RX ADMIN — KETOROLAC TROMETHAMINE 30 MG: 30 INJECTION, SOLUTION INTRAMUSCULAR at 16:02

## 2022-11-30 RX ADMIN — FENTANYL CITRATE 50 MCG: 50 INJECTION, SOLUTION INTRAMUSCULAR; INTRAVENOUS at 15:40

## 2022-11-30 RX ADMIN — MIDAZOLAM 1 MG: 1 INJECTION INTRAMUSCULAR; INTRAVENOUS at 14:58

## 2022-11-30 RX ADMIN — FENTANYL CITRATE 50 MCG: 50 INJECTION, SOLUTION INTRAMUSCULAR; INTRAVENOUS at 15:09

## 2022-11-30 RX ADMIN — SODIUM CHLORIDE, POTASSIUM CHLORIDE, SODIUM LACTATE AND CALCIUM CHLORIDE: 600; 310; 30; 20 INJECTION, SOLUTION INTRAVENOUS at 15:05

## 2022-11-30 RX ADMIN — SUCCINYLCHOLINE CHLORIDE 160 MG: 20 INJECTION, SOLUTION INTRAMUSCULAR; INTRAVENOUS; PARENTERAL at 15:09

## 2022-11-30 RX ADMIN — SUGAMMADEX 200 MG: 100 INJECTION, SOLUTION INTRAVENOUS at 16:09

## 2022-11-30 ASSESSMENT — ENCOUNTER SYMPTOMS
DYSRHYTHMIAS: 0
SEIZURES: 0

## 2022-11-30 ASSESSMENT — ACTIVITIES OF DAILY LIVING (ADL)
ADLS_ACUITY_SCORE: 35
ADLS_ACUITY_SCORE: 35

## 2022-11-30 NOTE — DISCHARGE INSTRUCTIONS
Same Day Surgery Discharge Instructions for  Sedation and General Anesthesia     It's not unusual to feel dizzy, light-headed or faint for up to 24 hours after surgery or while taking pain medication.  If you have these symptoms: sit for a few minutes before standing and have someone assist you when you get up to walk or use the bathroom.    You should rest and relax for the next 24 hours. We recommend you make arrangements to have an adult stay with you for at least 24 hours after your discharge.  Avoid hazardous and strenuous activity.    DO NOT DRIVE any vehicle or operate mechanical equipment for 24 hours following the end of your surgery.  Even though you may feel normal, your reactions may be affected by the medication you have received.    Do not drink alcoholic beverages for 24 hours following surgery.     Slowly progress to your regular diet as you feel able. It's not unusual to feel nauseated and/or vomit after receiving anesthesia.  If you develop these symptoms, drink clear liquids (apple juice, ginger ale, broth, 7-up, etc. ) until you feel better.  If your nausea and vomiting persists for 24 hours, please notify your surgeon.      All narcotic pain medications, along with inactivity and anesthesia, can cause constipation. Drinking plenty of liquids and increasing fiber intake will help.    For any questions of a medical nature, call your surgeon.    Do not make important decisions for 24 hours.    If you had general anesthesia, you may have a sore throat for a couple of days related to the breathing tube used during surgery.  You may use Cepacol lozenges to help with this discomfort.  If it worsens or if you develop a fever, contact your surgeon.     If you feel your pain is not well managed with the pain medications prescribed by your surgeon, please contact your surgeon's office to let them know so they can address your concerns.          Today you received Toradol, an antiinflammatory medication  similar to Ibuprofen.  You should not take other antiinflammatory medication, such as Ibuprofen, Motrin, Advil, Aleve, Naprosyn, etc until 10pm.    **If you have questions or concerns about your procedure,   call Dr. Sow at [phone]**

## 2022-11-30 NOTE — ANESTHESIA PROCEDURE NOTES
Airway       Patient location during procedure: OR       Procedure Start/Stop Times: 11/30/2022 3:13 PM  Staff -        Anesthesiologist:  Kody Moya DO       CRNA: Rocio Anthony APRN CRNA       Performed By: CRNA  Consent for Airway        Urgency: emergent  Indications and Patient Condition       Indications for airway management: wandy-procedural       Induction type:RSI       Mask difficulty assessment: 0 - not attempted    Final Airway Details       Final airway type: endotracheal airway       Successful airway: ETT - single  Endotracheal Airway Details        ETT size (mm): 7.0       Cuffed: yes       Successful intubation technique: video laryngoscopy       VL Blade Size: Glidescope 3       Grade View of Cords: 1       Adjucts: stylet       Position: Right       Measured from: lips       Secured at (cm): 21       Bite block used: None    Post intubation assessment        Placement verified by: capnometry, equal breath sounds and chest rise        Number of attempts at approach: 1       Secured with: pink tape       Ease of procedure: easy       Dentition: Intact and Unchanged    Medication(s) Administered   Medication Administration Time: 11/30/2022 3:13 PM

## 2022-11-30 NOTE — ANESTHESIA PREPROCEDURE EVALUATION
Anesthesia Pre-Procedure Evaluation    Patient: Rachel Ryder   MRN: [id number] : 1985        Procedure : Procedure(s):  DIAGNOSTIC LAPAROSCOPY, POSSIBLE UNILATERAL SALPINGOTOMY, POSSIBLE SALPINGECTOMY          History reviewed. No pertinent past medical history.   Past Surgical History:   Procedure Laterality Date     DILATION AND EVACUATION N/A 2022    Procedure: DILATION AND EVACUATION, UTERUS;  Surgeon: Geno Watkins MD;  Location: [facility] OR      Allergies   Allergen Reactions     Ceclor [Cefaclor]       Social History     Tobacco Use     Smoking status: Never     Smokeless tobacco: Never   Substance Use Topics     Alcohol use: Not Currently      Wt Readings from Last 1 Encounters:   22 68.1 kg (150 lb 2.1 oz)        Anesthesia Evaluation   Pt has had prior anesthetic.         ROS/MED HX  ENT/Pulmonary:    (-) asthma and sleep apnea   Neurologic:    (-) no seizures, no CVA and migraines   Cardiovascular:    (-) hypertension, CHF, arrhythmias, irregular heartbeat/palpitations and dyslipidemia   METS/Exercise Tolerance:     Hematologic:       Musculoskeletal:       GI/Hepatic:    (-) GERD and liver disease   Renal/Genitourinary: Comment: Concern for ectopic pregancy, previously she had a D&E on 22   (-) renal disease   Endo:    (-) Type II DM and thyroid disease   Psychiatric/Substance Use:       Infectious Disease:       Malignancy:       Other:            Physical Exam    Airway        Mallampati: I   TM distance: > 3 FB   Neck ROM: full   Mouth opening: > 3 cm    Respiratory Devices and Support         Dental  no notable dental history         Cardiovascular   cardiovascular exam normal          Pulmonary   pulmonary exam normal        breath sounds clear to auscultation           OUTSIDE LABS:  CBC:   Lab Results   Component Value Date    WBC 9.1 2022    WBC 12.3 (H) 2020    HGB 12.6 2022    HGB 10.9 (L) 2020    HCT 35.4 2022    HCT 31.4 (L) 2020      09/20/2022     06/02/2020     BMP:   Lab Results   Component Value Date     12/14/2005    POTASSIUM 3.7 12/14/2005    CHLORIDE 101 12/14/2005    CO2 26 12/14/2005    BUN 20 12/14/2005    CR 0.91 12/14/2005    GLC 83 09/20/2022     (H) 12/14/2005     COAGS: No results found for: PTT, INR, FIBR  POC:   Lab Results   Component Value Date    HCG Negative 12/14/2005     HEPATIC:   Lab Results   Component Value Date    ALBUMIN 4.5 12/14/2005    PROTTOTAL 8.7 (H) 12/14/2005    ALT 24 06/02/2020    AST 20 06/02/2020    ALKPHOS 84 12/14/2005    BILITOTAL 1.2 12/14/2005     OTHER:   Lab Results   Component Value Date    NELLY 9.6 12/14/2005    LIPASE 43 12/14/2005    AMYLASE 39 12/14/2005       Anesthesia Plan    ASA Status:  2, emergent    NPO Status:  NPO Appropriate    Anesthesia Type: General.     - Airway: ETT   Induction: Intravenous.   Maintenance: Balanced.        Consents    Anesthesia Plan(s) and associated risks, benefits, and realistic alternatives discussed. Questions answered and patient/representative(s) expressed understanding.    - Discussed:     - Discussed with:  Patient      - Extended Intubation/Ventilatory Support Discussed: No.      - Patient is DNR/DNI Status: No    Use of blood products discussed: Yes.     - Discussed with: Patient.     - Consented: consented to blood products            Reason for refusal: other.     Postoperative Care    Pain management: IV analgesics, Oral pain medications, Multi-modal analgesia.   PONV prophylaxis: Ondansetron (or other 5HT-3), Dexamethasone or Solumedrol, Background Propofol Infusion     Comments:                Kody Moya DO

## 2022-11-30 NOTE — PROGRESS NOTES
Pt called the clinic this am with vaginal bleeding and a pos UPT. Upon exam she was found to have a unilateral ectopic pregnancy. It appears to be on the right side and dw the pt about possible loss of the fallopian tube on the side that the pregnancy is on. Aware might need blood transfusion.   Labs are pending right now.  Pulse 88   Temp 99.1  F (37.3  C)   Resp 20   Wt 66.2 kg (145 lb 14.4 oz)   LMP 05/31/2022   SpO2 100%   Gen tearful  Abd tender more on the right  Ext no edema    PLAN consent signed for dx LS with possible unilateral salpingectomy or salpingostomy    Amy Sow, DO

## 2022-11-30 NOTE — OP NOTE
Procedure Date: 11/30/2022    SURGEON.  Amy Sow DO    FIRST ASSISTANT:    Mague Reynolds LPN.    PROCEDURE:  Diagnostic laparoscopy, right salpingectomy.    PREOPERATIVE DIAGNOSIS:  Ectopic pregnancy.    POSTOPERATIVE DIAGNOSIS:  Ectopic pregnancy.    ANESTHESIA:  General endotracheal.    EBL:  100 mL    COMPLICATIONS:  None.    SPECIMENS:  Ectopic pregnancy and right fallopian tube.    IMPLANTS:  None.    COMPLICATIONS:  None.    FINDINGS:  On laparoscopy, normal appearing left fallopian tube and ovary.  Normal external uterine anatomy. Normal right ovary.  The ectopic appeared to be in the midsection of the right fallopian tube.  Mild amount of free blood in the pelvis with a few small clots.  Normal liver edge and normal appearing appendix was also noted.    DESCRIPTION OF PROCEDURE:  After informed consent, Rachel was taken to the operating room and placed in supine position and underwent general anesthesia without difficulty.  The patient was then placed in dorsal lithotomy position, prepped and draped in the usual sterile fashion.  The sterile speculum was then placed in the vagina.  The cervix was grasped with a single tooth tenaculum.  The acorn uterine manipulator was then applied and the speculum was removed.  I placed a Christian catheter at this time and then my outer gloves were removed.    My attention was then turned to the patient's abdomen in which a 1 cm infraumbilical incision was made in the vertical fashion with the 11 blade.  The Veress needle was then placed without difficulty to obtain a pneumoperitoneum.  The saline drop test was appropriate with CO2 gas was placed with an opening pressure of 1 mmHg.  Pneumoperitoneum was obtained to a pressure of 15.  The Veress needle was then removed.  A 5 mm trocar was placed without difficulty.  The laparoscope confirmed I was in the peritoneal cavity and there was no evidence of bowel, bladder, major blood vessel injury seen.  Evaluation of the  pelvis revealed the above findings.  I required 2 additional ports in which a small 1 cm incision was made in the left lower quadrant and a 1 cm incision was made 2 cm above the symphysis pubis with a 5 mm trocar was placed in the left lower quadrant and a 7 mm trocar was placed in the symphysis pubis.  I used a liter of saline to suction irrigate the entire pelvis and I moved the patient on around from Trendelenburg to normal positioning to try to get all of the extra fluid released and suctioned out.  Her right fallopian tube was then grasped and the LigaSure cautery was used and transect to remove the entire right fallopian tube.  No bleeding was noted at any time with this.  Again, suction irrigation was done to be sure the entire pelvis was free of any blood.  All of the specimen was removed out of the 8 ports.  The clots were also removed and everything was sent to pathology.  No other complications were noted.  The CO2 gas was allowed to escape out of the umbilical trocar.  The other 2 trocars were removed without bleeding noted.  More of the Valsalva technique was performed to try to remove as much CO2 gas as possible.  All 3 incisions were closed with interrupted stitch of 4-0 Monocryl.  Steri-Strips and Band-Aids were then applied.  I then went vaginally, removed the acorn and the single-tooth tenaculum.  Excellent hemostasis was achieved from the single tooth tenaculum site.  All instruments were removed from the vagina.  Sponge and needle counts were correct.  There were no complications noted at the end of the procedure and the patient tolerated the procedure well and was taken to recovery room in stable condition.    Amy Sow DO        D: 2022   T: 2022   MT: ANGELITA    Name:     FERCHO MACHUCA  MRN:      [id number]        Account:        [id number]   :      1985           Procedure Date: 2022     Document: [id number]

## 2022-11-30 NOTE — BRIEF OP NOTE
[facility] Brief Operative Note    Pre-operative diagnosis: Ruptured ectopic pregnancy [O00.90]   Post-operative diagnosis * No post-op diagnosis entered *  same   Procedure: Procedure(s):  DIAGNOSTIC LAPAROSCOPY, RIGHT SALPINGECTOMY   Surgeon(s): Surgeon(s) and Role:     * Amy Sow DO - Primary   Estimated blood loss: * No values recorded between 11/30/2022  3:34 PM and 11/30/2022  4:23 PM *    Specimens: ID Type Source Tests Collected by Time Destination   1 : RIGHT ECTOPIC AND FALLOPIAN TUBE Products of Conception Fallopian Tube, Ectopic Pregnancy, Right SURGICAL PATHOLOGY EXAM Amy Sow DO 11/30/2022  4:04 PM       Findings: Small amount of blood int he pelvis with clots and ectopic pregnancy removed with the right fallopian tube    See Op note for further details    Amy Sow DO

## 2022-11-30 NOTE — ANESTHESIA CARE TRANSFER NOTE
Patient: Rachel Ryder    Procedure: Procedure(s):  DIAGNOSTIC LAPAROSCOPY, RIGHT SALPINGECTOMY       Diagnosis: Ruptured ectopic pregnancy [O00.90]  Diagnosis Additional Information: No value filed.    Anesthesia Type:   General     Note:    Oropharynx: oropharynx clear of all foreign objects and spontaneously breathing  Level of Consciousness: drowsy  Oxygen Supplementation: face mask  Level of Supplemental Oxygen (L/min / FiO2): 6  Independent Airway: airway patency satisfactory and stable  Dentition: dentition unchanged    Report to RN Given: handoff report given  Patient transferred to: PACU    Handoff Report: Identifed the Patient, Identified the Reponsible Provider, Reviewed the pertinent medical history, Discussed the surgical course, Reviewed Intra-OP anesthesia mangement and issues during anesthesia, Set expectations for post-procedure period and Allowed opportunity for questions and acknowledgement of understanding      Vitals:  Vitals Value Taken Time   BP 99/42    Temp     Pulse 71    Resp 12    SpO2 100 % 11/30/22 1625   Vitals shown include unvalidated device data.    Electronically Signed By: NAOMIE Arguelles CRNA  November 30, 2022  4:26 PM

## 2022-12-01 NOTE — ANESTHESIA POSTPROCEDURE EVALUATION
Patient: Rachel Ryder    Procedure: Procedure(s):  DIAGNOSTIC LAPAROSCOPY, RIGHT SALPINGECTOMY       Anesthesia Type:  General    Note:  Disposition: Inpatient   Postop Pain Control: Uneventful            Sign Out: Well controlled pain   PONV: No   Neuro/Psych: Uneventful            Sign Out: Acceptable/Baseline neuro status   Airway/Respiratory: Uneventful            Sign Out: Acceptable/Baseline resp. status   CV/Hemodynamics: Uneventful            Sign Out: Acceptable CV status; No obvious hypovolemia; No obvious fluid overload   Other NRE: NONE   DID A NON-ROUTINE EVENT OCCUR? No           Last vitals:  Vitals Value Taken Time   BP 96/56 11/30/22 1810   Temp 36.2  C (97.1  F) 11/30/22 1625   Pulse 73 11/30/22 1811   Resp 14 11/30/22 1811   SpO2 99 % 11/30/22 1811   Vitals shown include unvalidated device data.    Electronically Signed By: Kody Moya DO  November 30, 2022  9:47 PM

## 2022-12-05 LAB
PATH REPORT.COMMENTS IMP SPEC: NORMAL
PATH REPORT.COMMENTS IMP SPEC: NORMAL
PATH REPORT.FINAL DX SPEC: NORMAL
PATH REPORT.GROSS SPEC: NORMAL
PATH REPORT.MICROSCOPIC SPEC OTHER STN: NORMAL
PATH REPORT.RELEVANT HX SPEC: NORMAL
PHOTO IMAGE: NORMAL

## 2022-12-05 PROCEDURE — 88305 TISSUE EXAM BY PATHOLOGIST: CPT | Mod: 26 | Performed by: PATHOLOGY

## 2023-09-05 LAB
HEPATITIS B SURFACE ANTIGEN (EXTERNAL): NEGATIVE
HIV1+2 AB SERPL QL IA: NEGATIVE
RUBELLA ANTIBODY IGG (EXTERNAL): NORMAL

## 2023-09-06 LAB — VDRL (SYPHILIS) (EXTERNAL): NONREACTIVE

## 2024-02-14 ENCOUNTER — HOSPITAL ENCOUNTER (OUTPATIENT)
Facility: CLINIC | Age: 39
End: 2024-02-14
Payer: COMMERCIAL

## 2024-03-25 LAB — GROUP B STREPTOCOCCUS (EXTERNAL): NEGATIVE

## 2024-04-10 ENCOUNTER — ANESTHESIA EVENT (OUTPATIENT)
Dept: OBGYN | Facility: CLINIC | Age: 39
End: 2024-04-10
Payer: COMMERCIAL

## 2024-04-10 ENCOUNTER — ANESTHESIA (OUTPATIENT)
Dept: OBGYN | Facility: CLINIC | Age: 39
End: 2024-04-10
Payer: COMMERCIAL

## 2024-04-10 ENCOUNTER — HOSPITAL ENCOUNTER (INPATIENT)
Facility: CLINIC | Age: 39
LOS: 1 days | Discharge: HOME | End: 2024-04-11
Attending: OBSTETRICS & GYNECOLOGY | Admitting: OBSTETRICS & GYNECOLOGY
Payer: COMMERCIAL

## 2024-04-10 LAB
ABO/RH(D): NORMAL
ABO/RH(D): NORMAL
ANTIBODY SCREEN: NEGATIVE
ERYTHROCYTE [DISTWIDTH] IN BLOOD BY AUTOMATED COUNT: 12.4 % (ref 10–15)
FETAL BLEED SCREEN: NORMAL
HCT VFR BLD AUTO: 31.6 % (ref 35–47)
HGB BLD-MCNC: 10.9 G/DL (ref 11.7–15.7)
MCH RBC QN AUTO: 30.9 PG (ref 26.5–33)
MCHC RBC AUTO-ENTMCNC: 34.5 G/DL (ref 31.5–36.5)
MCV RBC AUTO: 90 FL (ref 78–100)
PLATELET # BLD AUTO: 172 10E3/UL (ref 150–450)
RBC # BLD AUTO: 3.53 10E6/UL (ref 3.8–5.2)
SPECIMEN EXPIRATION DATE: NORMAL
SPECIMEN EXPIRATION DATE: NORMAL
T PALLIDUM AB SER QL: NONREACTIVE
WBC # BLD AUTO: 11.6 10E3/UL (ref 4–11)

## 2024-04-10 PROCEDURE — 250N000011 HC RX IP 250 OP 636: Performed by: SURGERY

## 2024-04-10 PROCEDURE — 722N000001 HC LABOR CARE VAGINAL DELIVERY SINGLE

## 2024-04-10 PROCEDURE — 10907ZC DRAINAGE OF AMNIOTIC FLUID, THERAPEUTIC FROM PRODUCTS OF CONCEPTION, VIA NATURAL OR ARTIFICIAL OPENING: ICD-10-PCS | Performed by: OBSTETRICS & GYNECOLOGY

## 2024-04-10 PROCEDURE — 120N000012 HC R&B POSTPARTUM

## 2024-04-10 PROCEDURE — 250N000013 HC RX MED GY IP 250 OP 250 PS 637: Performed by: OBSTETRICS & GYNECOLOGY

## 2024-04-10 PROCEDURE — 86900 BLOOD TYPING SEROLOGIC ABO: CPT | Performed by: OBSTETRICS & GYNECOLOGY

## 2024-04-10 PROCEDURE — 00HU33Z INSERTION OF INFUSION DEVICE INTO SPINAL CANAL, PERCUTANEOUS APPROACH: ICD-10-PCS | Performed by: SURGERY

## 2024-04-10 PROCEDURE — 36415 COLL VENOUS BLD VENIPUNCTURE: CPT | Performed by: OBSTETRICS & GYNECOLOGY

## 2024-04-10 PROCEDURE — 85027 COMPLETE CBC AUTOMATED: CPT | Performed by: OBSTETRICS & GYNECOLOGY

## 2024-04-10 PROCEDURE — 250N000009 HC RX 250: Performed by: SURGERY

## 2024-04-10 PROCEDURE — 370N000003 HC ANESTHESIA WARD SERVICE: Performed by: SURGERY

## 2024-04-10 PROCEDURE — 250N000009 HC RX 250: Performed by: OBSTETRICS & GYNECOLOGY

## 2024-04-10 PROCEDURE — 258N000003 HC RX IP 258 OP 636: Performed by: OBSTETRICS & GYNECOLOGY

## 2024-04-10 PROCEDURE — G0463 HOSPITAL OUTPT CLINIC VISIT: HCPCS

## 2024-04-10 PROCEDURE — 3E0R3BZ INTRODUCTION OF ANESTHETIC AGENT INTO SPINAL CANAL, PERCUTANEOUS APPROACH: ICD-10-PCS | Performed by: SURGERY

## 2024-04-10 PROCEDURE — 0KQM0ZZ REPAIR PERINEUM MUSCLE, OPEN APPROACH: ICD-10-PCS | Performed by: OBSTETRICS & GYNECOLOGY

## 2024-04-10 PROCEDURE — 86780 TREPONEMA PALLIDUM: CPT | Performed by: OBSTETRICS & GYNECOLOGY

## 2024-04-10 PROCEDURE — 59400 OBSTETRICAL CARE: CPT | Performed by: SURGERY

## 2024-04-10 PROCEDURE — 85461 HEMOGLOBIN FETAL: CPT | Performed by: OBSTETRICS & GYNECOLOGY

## 2024-04-10 RX ORDER — OXYTOCIN/0.9 % SODIUM CHLORIDE 30/500 ML
1-24 PLASTIC BAG, INJECTION (ML) INTRAVENOUS CONTINUOUS
Status: DISCONTINUED | OUTPATIENT
Start: 2024-04-10 | End: 2024-04-10 | Stop reason: HOSPADM

## 2024-04-10 RX ORDER — PROCHLORPERAZINE MALEATE 5 MG
10 TABLET ORAL EVERY 6 HOURS PRN
Status: DISCONTINUED | OUTPATIENT
Start: 2024-04-10 | End: 2024-04-10 | Stop reason: HOSPADM

## 2024-04-10 RX ORDER — NALBUPHINE HYDROCHLORIDE 20 MG/ML
2.5-5 INJECTION, SOLUTION INTRAMUSCULAR; INTRAVENOUS; SUBCUTANEOUS EVERY 6 HOURS PRN
Status: DISCONTINUED | OUTPATIENT
Start: 2024-04-10 | End: 2024-04-11 | Stop reason: HOSPADM

## 2024-04-10 RX ORDER — IBUPROFEN 400 MG/1
800 TABLET, FILM COATED ORAL EVERY 6 HOURS PRN
Status: DISCONTINUED | OUTPATIENT
Start: 2024-04-10 | End: 2024-04-11 | Stop reason: HOSPADM

## 2024-04-10 RX ORDER — LIDOCAINE 40 MG/G
CREAM TOPICAL
Status: DISCONTINUED | OUTPATIENT
Start: 2024-04-10 | End: 2024-04-10 | Stop reason: HOSPADM

## 2024-04-10 RX ORDER — ONDANSETRON 4 MG/1
4 TABLET, ORALLY DISINTEGRATING ORAL EVERY 6 HOURS PRN
Status: DISCONTINUED | OUTPATIENT
Start: 2024-04-10 | End: 2024-04-10 | Stop reason: HOSPADM

## 2024-04-10 RX ORDER — METOCLOPRAMIDE HYDROCHLORIDE 5 MG/ML
10 INJECTION INTRAMUSCULAR; INTRAVENOUS EVERY 6 HOURS PRN
Status: DISCONTINUED | OUTPATIENT
Start: 2024-04-10 | End: 2024-04-10 | Stop reason: HOSPADM

## 2024-04-10 RX ORDER — OXYTOCIN 10 [USP'U]/ML
10 INJECTION, SOLUTION INTRAMUSCULAR; INTRAVENOUS
Status: DISCONTINUED | OUTPATIENT
Start: 2024-04-10 | End: 2024-04-10

## 2024-04-10 RX ORDER — TRANEXAMIC ACID 10 MG/ML
1 INJECTION, SOLUTION INTRAVENOUS EVERY 30 MIN PRN
Status: DISCONTINUED | OUTPATIENT
Start: 2024-04-10 | End: 2024-04-11 | Stop reason: HOSPADM

## 2024-04-10 RX ORDER — NALOXONE HYDROCHLORIDE 0.4 MG/ML
0.2 INJECTION, SOLUTION INTRAMUSCULAR; INTRAVENOUS; SUBCUTANEOUS
Status: DISCONTINUED | OUTPATIENT
Start: 2024-04-10 | End: 2024-04-11 | Stop reason: HOSPADM

## 2024-04-10 RX ORDER — METHYLERGONOVINE MALEATE 0.2 MG/ML
200 INJECTION INTRAVENOUS
Status: DISCONTINUED | OUTPATIENT
Start: 2024-04-10 | End: 2024-04-10 | Stop reason: HOSPADM

## 2024-04-10 RX ORDER — LOPERAMIDE HCL 2 MG
4 CAPSULE ORAL
Status: DISCONTINUED | OUTPATIENT
Start: 2024-04-10 | End: 2024-04-11 | Stop reason: HOSPADM

## 2024-04-10 RX ORDER — MISOPROSTOL 200 UG/1
800 TABLET ORAL
Status: DISCONTINUED | OUTPATIENT
Start: 2024-04-10 | End: 2024-04-11 | Stop reason: HOSPADM

## 2024-04-10 RX ORDER — KETOROLAC TROMETHAMINE 30 MG/ML
30 INJECTION, SOLUTION INTRAMUSCULAR; INTRAVENOUS
Status: DISCONTINUED | OUTPATIENT
Start: 2024-04-10 | End: 2024-04-10

## 2024-04-10 RX ORDER — OXYTOCIN 10 [USP'U]/ML
10 INJECTION, SOLUTION INTRAMUSCULAR; INTRAVENOUS
Status: DISCONTINUED | OUTPATIENT
Start: 2024-04-10 | End: 2024-04-10 | Stop reason: HOSPADM

## 2024-04-10 RX ORDER — ONDANSETRON 2 MG/ML
4 INJECTION INTRAMUSCULAR; INTRAVENOUS EVERY 6 HOURS PRN
Status: DISCONTINUED | OUTPATIENT
Start: 2024-04-10 | End: 2024-04-10 | Stop reason: HOSPADM

## 2024-04-10 RX ORDER — PROCHLORPERAZINE 25 MG
25 SUPPOSITORY, RECTAL RECTAL EVERY 12 HOURS PRN
Status: DISCONTINUED | OUTPATIENT
Start: 2024-04-10 | End: 2024-04-10 | Stop reason: HOSPADM

## 2024-04-10 RX ORDER — TRANEXAMIC ACID 10 MG/ML
1 INJECTION, SOLUTION INTRAVENOUS EVERY 30 MIN PRN
Status: DISCONTINUED | OUTPATIENT
Start: 2024-04-10 | End: 2024-04-10 | Stop reason: HOSPADM

## 2024-04-10 RX ORDER — OXYTOCIN/0.9 % SODIUM CHLORIDE 30/500 ML
100-340 PLASTIC BAG, INJECTION (ML) INTRAVENOUS CONTINUOUS PRN
Status: DISCONTINUED | OUTPATIENT
Start: 2024-04-10 | End: 2024-04-10

## 2024-04-10 RX ORDER — MISOPROSTOL 200 UG/1
400 TABLET ORAL
Status: DISCONTINUED | OUTPATIENT
Start: 2024-04-10 | End: 2024-04-10 | Stop reason: HOSPADM

## 2024-04-10 RX ORDER — BISACODYL 10 MG
10 SUPPOSITORY, RECTAL RECTAL DAILY PRN
Status: DISCONTINUED | OUTPATIENT
Start: 2024-04-10 | End: 2024-04-11 | Stop reason: HOSPADM

## 2024-04-10 RX ORDER — LOPERAMIDE HCL 2 MG
2 CAPSULE ORAL
Status: DISCONTINUED | OUTPATIENT
Start: 2024-04-10 | End: 2024-04-10 | Stop reason: HOSPADM

## 2024-04-10 RX ORDER — MODIFIED LANOLIN
OINTMENT (GRAM) TOPICAL
Status: DISCONTINUED | OUTPATIENT
Start: 2024-04-10 | End: 2024-04-11 | Stop reason: HOSPADM

## 2024-04-10 RX ORDER — NALOXONE HYDROCHLORIDE 0.4 MG/ML
0.4 INJECTION, SOLUTION INTRAMUSCULAR; INTRAVENOUS; SUBCUTANEOUS
Status: DISCONTINUED | OUTPATIENT
Start: 2024-04-10 | End: 2024-04-11 | Stop reason: HOSPADM

## 2024-04-10 RX ORDER — MISOPROSTOL 200 UG/1
400 TABLET ORAL
Status: DISCONTINUED | OUTPATIENT
Start: 2024-04-10 | End: 2024-04-11 | Stop reason: HOSPADM

## 2024-04-10 RX ORDER — OXYTOCIN/0.9 % SODIUM CHLORIDE 30/500 ML
340 PLASTIC BAG, INJECTION (ML) INTRAVENOUS CONTINUOUS PRN
Status: DISCONTINUED | OUTPATIENT
Start: 2024-04-10 | End: 2024-04-11 | Stop reason: HOSPADM

## 2024-04-10 RX ORDER — CARBOPROST TROMETHAMINE 250 UG/ML
250 INJECTION, SOLUTION INTRAMUSCULAR
Status: DISCONTINUED | OUTPATIENT
Start: 2024-04-10 | End: 2024-04-11 | Stop reason: HOSPADM

## 2024-04-10 RX ORDER — CITRIC ACID/SODIUM CITRATE 334-500MG
30 SOLUTION, ORAL ORAL
Status: DISCONTINUED | OUTPATIENT
Start: 2024-04-10 | End: 2024-04-10 | Stop reason: HOSPADM

## 2024-04-10 RX ORDER — SODIUM CHLORIDE, SODIUM LACTATE, POTASSIUM CHLORIDE, CALCIUM CHLORIDE 600; 310; 30; 20 MG/100ML; MG/100ML; MG/100ML; MG/100ML
INJECTION, SOLUTION INTRAVENOUS CONTINUOUS PRN
Status: DISCONTINUED | OUTPATIENT
Start: 2024-04-10 | End: 2024-04-10 | Stop reason: HOSPADM

## 2024-04-10 RX ORDER — PRENATAL VIT/IRON FUM/FOLIC AC 27MG-0.8MG
1 TABLET ORAL DAILY
Status: DISCONTINUED | OUTPATIENT
Start: 2024-04-10 | End: 2024-04-11 | Stop reason: HOSPADM

## 2024-04-10 RX ORDER — MULTIVITAMIN WITH IRON
1 TABLET ORAL DAILY
COMMUNITY

## 2024-04-10 RX ORDER — IBUPROFEN 400 MG/1
800 TABLET, FILM COATED ORAL
Status: DISCONTINUED | OUTPATIENT
Start: 2024-04-10 | End: 2024-04-10

## 2024-04-10 RX ORDER — CARBOPROST TROMETHAMINE 250 UG/ML
250 INJECTION, SOLUTION INTRAMUSCULAR
Status: DISCONTINUED | OUTPATIENT
Start: 2024-04-10 | End: 2024-04-10 | Stop reason: HOSPADM

## 2024-04-10 RX ORDER — ROPIVACAINE HYDROCHLORIDE 2 MG/ML
10 INJECTION, SOLUTION EPIDURAL; INFILTRATION; PERINEURAL ONCE
Status: DISCONTINUED | OUTPATIENT
Start: 2024-04-10 | End: 2024-04-10 | Stop reason: HOSPADM

## 2024-04-10 RX ORDER — OXYTOCIN/0.9 % SODIUM CHLORIDE 30/500 ML
340 PLASTIC BAG, INJECTION (ML) INTRAVENOUS CONTINUOUS PRN
Status: DISCONTINUED | OUTPATIENT
Start: 2024-04-10 | End: 2024-04-10 | Stop reason: HOSPADM

## 2024-04-10 RX ORDER — SODIUM CHLORIDE, SODIUM LACTATE, POTASSIUM CHLORIDE, CALCIUM CHLORIDE 600; 310; 30; 20 MG/100ML; MG/100ML; MG/100ML; MG/100ML
INJECTION, SOLUTION INTRAVENOUS CONTINUOUS
Status: DISCONTINUED | OUTPATIENT
Start: 2024-04-10 | End: 2024-04-10 | Stop reason: HOSPADM

## 2024-04-10 RX ORDER — NALOXONE HYDROCHLORIDE 0.4 MG/ML
0.2 INJECTION, SOLUTION INTRAMUSCULAR; INTRAVENOUS; SUBCUTANEOUS
Status: DISCONTINUED | OUTPATIENT
Start: 2024-04-10 | End: 2024-04-10 | Stop reason: HOSPADM

## 2024-04-10 RX ORDER — METOCLOPRAMIDE 10 MG/1
10 TABLET ORAL EVERY 6 HOURS PRN
Status: DISCONTINUED | OUTPATIENT
Start: 2024-04-10 | End: 2024-04-10 | Stop reason: HOSPADM

## 2024-04-10 RX ORDER — HYDROCORTISONE 25 MG/G
CREAM TOPICAL 3 TIMES DAILY PRN
Status: DISCONTINUED | OUTPATIENT
Start: 2024-04-10 | End: 2024-04-11 | Stop reason: HOSPADM

## 2024-04-10 RX ORDER — OXYTOCIN 10 [USP'U]/ML
10 INJECTION, SOLUTION INTRAMUSCULAR; INTRAVENOUS
Status: DISCONTINUED | OUTPATIENT
Start: 2024-04-10 | End: 2024-04-11 | Stop reason: HOSPADM

## 2024-04-10 RX ORDER — LOPERAMIDE HCL 2 MG
4 CAPSULE ORAL
Status: DISCONTINUED | OUTPATIENT
Start: 2024-04-10 | End: 2024-04-10 | Stop reason: HOSPADM

## 2024-04-10 RX ORDER — ROPIVACAINE HYDROCHLORIDE 2 MG/ML
INJECTION, SOLUTION EPIDURAL; INFILTRATION; PERINEURAL
Status: COMPLETED | OUTPATIENT
Start: 2024-04-10 | End: 2024-04-10

## 2024-04-10 RX ORDER — LOPERAMIDE HCL 2 MG
2 CAPSULE ORAL
Status: DISCONTINUED | OUTPATIENT
Start: 2024-04-10 | End: 2024-04-11 | Stop reason: HOSPADM

## 2024-04-10 RX ORDER — HYDROCODONE BITARTRATE AND ACETAMINOPHEN 5; 325 MG/1; MG/1
1 TABLET ORAL EVERY 6 HOURS PRN
Status: DISCONTINUED | OUTPATIENT
Start: 2024-04-10 | End: 2024-04-11

## 2024-04-10 RX ORDER — NALOXONE HYDROCHLORIDE 0.4 MG/ML
0.4 INJECTION, SOLUTION INTRAMUSCULAR; INTRAVENOUS; SUBCUTANEOUS
Status: DISCONTINUED | OUTPATIENT
Start: 2024-04-10 | End: 2024-04-10 | Stop reason: HOSPADM

## 2024-04-10 RX ORDER — BUPIVACAINE HYDROCHLORIDE 5 MG/ML
INJECTION, SOLUTION EPIDURAL; INTRACAUDAL
Status: DISCONTINUED | OUTPATIENT
Start: 2024-04-10 | End: 2024-04-10

## 2024-04-10 RX ORDER — FENTANYL CITRATE 50 UG/ML
INJECTION, SOLUTION INTRAMUSCULAR; INTRAVENOUS
Status: DISCONTINUED | OUTPATIENT
Start: 2024-04-10 | End: 2024-04-10

## 2024-04-10 RX ORDER — ACETAMINOPHEN 325 MG/1
650 TABLET ORAL EVERY 4 HOURS PRN
Status: DISCONTINUED | OUTPATIENT
Start: 2024-04-10 | End: 2024-04-11 | Stop reason: HOSPADM

## 2024-04-10 RX ORDER — DOCUSATE SODIUM 100 MG/1
100 CAPSULE, LIQUID FILLED ORAL DAILY
Status: DISCONTINUED | OUTPATIENT
Start: 2024-04-10 | End: 2024-04-11 | Stop reason: HOSPADM

## 2024-04-10 RX ORDER — METHYLERGONOVINE MALEATE 0.2 MG/ML
200 INJECTION INTRAVENOUS
Status: DISCONTINUED | OUTPATIENT
Start: 2024-04-10 | End: 2024-04-11 | Stop reason: HOSPADM

## 2024-04-10 RX ORDER — MULTIVITAMIN WITH IRON
250 TABLET ORAL DAILY
Status: DISCONTINUED | OUTPATIENT
Start: 2024-04-10 | End: 2024-04-11 | Stop reason: HOSPADM

## 2024-04-10 RX ORDER — EPHEDRINE SULFATE 50 MG/ML
5 INJECTION, SOLUTION INTRAMUSCULAR; INTRAVENOUS; SUBCUTANEOUS
Status: DISCONTINUED | OUTPATIENT
Start: 2024-04-10 | End: 2024-04-10 | Stop reason: HOSPADM

## 2024-04-10 RX ORDER — MISOPROSTOL 200 UG/1
800 TABLET ORAL
Status: DISCONTINUED | OUTPATIENT
Start: 2024-04-10 | End: 2024-04-10 | Stop reason: HOSPADM

## 2024-04-10 RX ORDER — FENTANYL CITRATE 50 UG/ML
100 INJECTION, SOLUTION INTRAMUSCULAR; INTRAVENOUS
Status: DISCONTINUED | OUTPATIENT
Start: 2024-04-10 | End: 2024-04-10 | Stop reason: HOSPADM

## 2024-04-10 RX ADMIN — IBUPROFEN 800 MG: 400 TABLET ORAL at 19:57

## 2024-04-10 RX ADMIN — Medication: at 03:58

## 2024-04-10 RX ADMIN — ACETAMINOPHEN 650 MG: 325 TABLET, FILM COATED ORAL at 13:04

## 2024-04-10 RX ADMIN — EPHEDRINE SULFATE 5 MG: 5 INJECTION INTRAVENOUS at 04:23

## 2024-04-10 RX ADMIN — Medication 2 MILLI-UNITS/MIN: at 08:08

## 2024-04-10 RX ADMIN — Medication: at 10:23

## 2024-04-10 RX ADMIN — ROPIVACAINE HYDROCHLORIDE 10 ML: 2 INJECTION, SOLUTION EPIDURAL; INFILTRATION at 04:04

## 2024-04-10 RX ADMIN — EPHEDRINE SULFATE 5 MG: 5 INJECTION INTRAVENOUS at 04:37

## 2024-04-10 RX ADMIN — HYDROCODONE BITARTRATE AND ACETAMINOPHEN 1 TABLET: 5; 325 TABLET ORAL at 23:23

## 2024-04-10 RX ADMIN — IBUPROFEN 800 MG: 400 TABLET ORAL at 13:04

## 2024-04-10 RX ADMIN — ACETAMINOPHEN 650 MG: 325 TABLET, FILM COATED ORAL at 19:57

## 2024-04-10 RX ADMIN — SODIUM CHLORIDE, POTASSIUM CHLORIDE, SODIUM LACTATE AND CALCIUM CHLORIDE 500 ML: 600; 310; 30; 20 INJECTION, SOLUTION INTRAVENOUS at 03:35

## 2024-04-10 ASSESSMENT — ACTIVITIES OF DAILY LIVING (ADL)
ADLS_ACUITY_SCORE: 18
ADLS_ACUITY_SCORE: 19
ADLS_ACUITY_SCORE: 19
ADLS_ACUITY_SCORE: 18
ADLS_ACUITY_SCORE: 19
ADLS_ACUITY_SCORE: 18
ADLS_ACUITY_SCORE: 19
ADLS_ACUITY_SCORE: 18
ADLS_ACUITY_SCORE: 19
ADLS_ACUITY_SCORE: 18
ADLS_ACUITY_SCORE: 19
ADLS_ACUITY_SCORE: 19
ADLS_ACUITY_SCORE: 18
ADLS_ACUITY_SCORE: 19
ADLS_ACUITY_SCORE: 18
ADLS_ACUITY_SCORE: 19
ADLS_ACUITY_SCORE: 19

## 2024-04-10 NOTE — PROCEDURES
DELIVERY SUMMARY:  Date: 4/10/2024    HISTORY:  Rachel Ryder is a 38 year old  at 39w4d with a Levi gestation. Prenatal course complicated by AMA. O NEG, rubella immune.  Lab Results   Component Value Date    GBS Negative 2024         FIRST STAGE:  She presented to labor and delivery with active labor.  Amniotic fluid was noted to be clear at the time of Artificial rupture of membranes (AROM) at 0800.  She progressed to complete at 1050 with IV oxytocin augmentation with max rate of 8mU.  Epidural analgesia. Fetal heart tones Category 1.      SECOND STAGE:   She started pushing at 1117 and delivered spontaneously, over an intact perineum at 1119.   A single nuchal cord was reduced after delivery.. Delayed cord clamping was performed for 60 seconds. The infant breathed and cried spontaneously. APGAR 1 minute: 9 , APGAR 5 minutes: 9 ,vigorous weight pending male  infant.    THIRD STAGE:  Pitocin was administered after delivery of the infant.  The placenta delivered spontaneously with gentle controlled cord traction.  A midline second degree perineal laceration was repaired in the usual fashion with 3-0 chromic and 2-0 vicryl suture.  Sponge and needle counts were correct.  Quantitated blood loss was 150 cc.    Mom and baby doing well and stable to transfer to postpartum recovery.    Amy Sow DO

## 2024-04-10 NOTE — PLAN OF CARE
[facility] presented to [facility]: 4/10/2024  2:19 AM.  Reason for maternal/fetal assessment is uterine contractions. Patient reports moderate contractions every few minutes since 11pm. Patient denies leaking of vaginal fluid/rupture of membranes, vaginal bleeding, vomiting, headache, visual disturbances, epigastric or RUQ pain, significant edema. Patient reports fetal movement is normal. Patient is a 39w4d . Prenatal record reviewed. Pregnancy has been uncomplicated. Support person Ty is present.     Fetal HR baseline was 115, variability is moderate (amplitude range 6 to 25 bpm). Accelerations: absent. Decelerations: absent. Uterine assessment is moderate by palpation during contractions and soft by palpation at rest. Cervix 3.5/60/-3. Fetal presentation cephalic per cervical exam. Membranes: intact.  Patient very tearful and requesting an epidural.    Dr. Horta paged and updated on FHTs 115 with moderate variability, moderate contractions q 1.5-4 minutes, SVE, patient requesting an epidural. Received orders for admission. Patient moved to room 214, report given to Jesica LAU RN to assume cares.

## 2024-04-10 NOTE — ANESTHESIA PROCEDURE NOTES
"Intrathecal Procedure Note    Pre-Procedure   Staff -        Anesthesiologist:  Sanjeev Lutz MD       Performed By: anesthesiologist       Location: OB       Pre-Anesthestic Checklist: patient identified, IV checked, risks and benefits discussed, informed consent, monitors and equipment checked, pre-op evaluation and at physician/surgeon's request  Timeout:       Correct Patient: Yes        Correct Procedure: Yes        Correct Site: Yes        Correct Position: Yes   Procedure Documentation  Procedure: intrathecal       Patient Position: sitting       Patient Prep/Sterile Barriers: sterile gloves, mask, patient draped       Skin prep: Betadine       Insertion Site: L3-4. (midline approach).       Spinal Needle Type: Yahaira-Nick       Introducer used       Introducer: 20 G       # of attempts: 1 and  # of redirects:          : 0.    Assessment/Narrative         Paresthesias: No.       CSF fluid: clear.    Medication(s) Administered   0.5% Bupivacaine PF (Intrathecal) - Intrathecal   1 mL - 4/10/2024 4:01:00 AM  Fentanyl PF (Intrathecal) - Intrathecal   25 mcg - 4/10/2024 4:01:00 AM   Comments:  Injected 1 cc of 0.25% PF Bupivacaine (plain) + 25 mcg of PF Fentanyl - no paresthesias or s/s of IV injection during injection.    Pt immediately back to supine + TORRI.    FHTs stable post-procedure.    Pt tolerated well. No complications.        FOR [facility] ([facility]) ONLY:   Pain Team Contact information: please page the Pain Team Via [employer]. Search \"Pain\". During daytime hours, please page the attending first. At night please page the resident first.      "

## 2024-04-10 NOTE — H&P
Prenatals reviewed  39yo  at 39+4 weeks in labor  O neg/ RI/ GBS neg  AMA with normal genetic testing  SKYLAR placed  SVE 3/60/-3  AROM-clear, FSE placed for FHR tracing difficult to maintain  EFW 8#    PLAN: pit augmentation due to contraction spacing and no change in SVE  Cont position changes every 30 minutes  LAUREN Sow, DO

## 2024-04-10 NOTE — PLAN OF CARE
Data: Rachel Ryder transferred to 429 via wheelchair at 1410. Baby transferred via parent's arms.  Action: Receiving unit notified of transfer: Yes. Patient and family notified of room change. Report given to TRISTAN Ureña RN at 1410. Belongings sent to receiving unit. Accompanied by Registered Nurse. Oriented patient to surroundings. Call light within reach. ID bands double-checked with receiving RN.  Response: Patient tolerated transfer and is stable.

## 2024-04-10 NOTE — PLAN OF CARE
Pt. admitted from L&D  via wheelchair and transferred to bed with stby. Pt. arrived with baby and was accompanied by spouse, Ty and arrived with personal belongings. Report was taken from KAYLEIGH De Souza in L&D. VSS. Fundus is firm and midline.  Vaginal bleeding is scant.  SL intact.  Pt. oriented to the room and call light system.

## 2024-04-10 NOTE — PLAN OF CARE
Goal Outcome Evaluation:      Plan of Care Reviewed With: patient, spouse    Overall Patient Progress: improving  Vss, fundus firm with scant flow. Pt wearing ice and tucks to perineum for comfort and taking tylenol and ibuprofen for adequate pain control. Able to ambulate and is voiding. Breast feeding . Encouraged to call for latch check. Spouse at bedside and supportive. Encouraged to call with questions/needs.

## 2024-04-10 NOTE — CARE PLAN
Pt admitted to 214. IV access and labs obtained. IV bolus started. Not tracing with externals. Mi monitor offered.  Patient denies adhesive allergy. Specifically discussed no previous reaction to band-aids, other adhesives or other medical patches. Mi monitoring in use. Patient educated that redness may occur following removal of the patches and will slowly fade. Patient instructed to notify nurse if any adverse reaction noted.

## 2024-04-10 NOTE — ANESTHESIA PROCEDURE NOTES
"Epidural catheter Procedure Note    Pre-Procedure   Staff -        Anesthesiologist:  Sanjeev Lutz MD       Performed By: anesthesiologist       Location: OB       Pre-Anesthestic Checklist: patient identified, IV checked, risks and benefits discussed, informed consent, monitors and equipment checked, pre-op evaluation and at physician/surgeon's request  Timeout:       Correct Patient: Yes        Correct Procedure: Yes        Correct Site: Yes        Correct Position: Yes   Procedure Documentation  Procedure: epidural catheter       Patient Position: sitting       Patient Prep/Sterile Barriers: sterile gloves, mask, patient draped       Skin prep: Betadine       Local skin infiltrated with 3 mL of 1% lidocaine.        Insertion Site: L3-4. (midline approach).       Technique: LORT saline        CAROLINE at 5 cm.       Needle Type: ToRNDOMNy needle       Needle Gauge: 17.        Needle Length (Inches): 3.5        Catheter: 19 G.          Catheter threaded easily.         4 cm epidural space.         Threaded 9 cm at skin.         # of attempts: 1 and  # of redirects:          : 0.    Assessment/Narrative         Paresthesias: No.       Test dose of 3 mL lidocaine 1.5% w/ 1:200,000 epinephrine at.         Test dose negative, 3 minutes after injection, for signs of intravascular, subdural, or intrathecal injection.       Insertion/Infusion Method: LORT saline       Aspiration negative for Heme or CSF via Epidural Catheter.    Medication(s) Administered   0.2% Ropivacaine (Epidural) - EPIDURAL   10 mL - 4/10/2024 4:04:00 AM   Comments:  Pt tolerated well. No complications.   Catheter taped sterile and securely with sterile medical adhesive spray and tegaderm.   Pt placed back in supine with TORRI.   FHTs stable post-procedure.        FOR [facility] ([facility]) ONLY:   Pain Team Contact information: please page the Pain Team Via [employer]. Search \"Pain\". During daytime hours, please page the attending first. At night please " page the resident first.

## 2024-04-10 NOTE — ANESTHESIA PREPROCEDURE EVALUATION
Anesthesia Pre-Procedure Evaluation    Patient: Rachel Ryder   MRN: [id number] : 1985        Procedure :           No past medical history on file.   Past Surgical History:   Procedure Laterality Date    DILATION AND EVACUATION N/A 2022    Procedure: DILATION AND EVACUATION, UTERUS;  Surgeon: Geno Watkins MD;  Location: [facility] OR    LAPAROSCOPIC SALPINGECTOMY Bilateral 2022    Procedure: DIAGNOSTIC LAPAROSCOPY, RIGHT SALPINGECTOMY;  Surgeon: Amy Sow DO;  Location: [facility] OR      Allergies   Allergen Reactions    Ceclor [Cefaclor]       Social History     Tobacco Use    Smoking status: Never    Smokeless tobacco: Never   Substance Use Topics    Alcohol use: Not Currently      Wt Readings from Last 1 Encounters:   04/10/24 78.9 kg (174 lb)        Anesthesia Evaluation            ROS/MED HX  ENT/Pulmonary:    (-) asthma   Neurologic:  - neg neurologic ROS     Cardiovascular:    (-) PIH   METS/Exercise Tolerance:     Hematologic:     (+)  no anticoagulation therapy, no coagulopathy,             Musculoskeletal:       GI/Hepatic:       Renal/Genitourinary:       Endo:       Psychiatric/Substance Use:       Infectious Disease:       Malignancy:       Other:            Physical Exam    Airway        Mallampati: II   TM distance: > 3 FB   Neck ROM: full     Respiratory Devices and Support         Dental  no notable dental history         Cardiovascular   cardiovascular exam normal          Pulmonary   pulmonary exam normal                OUTSIDE LABS:  CBC:   Lab Results   Component Value Date    WBC 11.6 (H) 04/10/2024    WBC 5.8 2022    HGB 10.9 (L) 04/10/2024    HGB 12.3 2022    HCT 31.6 (L) 04/10/2024    HCT 36.6 2022     04/10/2024     2022     BMP:   Lab Results   Component Value Date     2005    POTASSIUM 3.7 2005    CHLORIDE 101 2005    CO2 26 2005    BUN 20 2005    CR 0.91 2005    GLC 83 2022    GLC  "118 (H) 12/14/2005     COAGS: No results found for: \"PTT\", \"INR\", \"FIBR\"  POC:   Lab Results   Component Value Date    HCG Negative 12/14/2005     HEPATIC:   Lab Results   Component Value Date    ALBUMIN 4.5 12/14/2005    PROTTOTAL 8.7 (H) 12/14/2005    ALT 24 06/02/2020    AST 20 06/02/2020    ALKPHOS 84 12/14/2005    BILITOTAL 1.2 12/14/2005     OTHER:   Lab Results   Component Value Date    NELLY 9.6 12/14/2005    LIPASE 43 12/14/2005    AMYLASE 39 12/14/2005       Anesthesia Plan    ASA Status:  2       Anesthesia Type: Epidural.              Consents    Anesthesia Plan(s) and associated risks, benefits, and realistic alternatives discussed. Questions answered and patient/representative(s) expressed understanding.     - Discussed:     - Discussed with:  Patient            Postoperative Care            Comments:    Other Comments: Orders to manage the epidural infusion have been entered, and through coordination with the nurse, we will continute to manage and monitor the patient's labor epidural.  We will continuously be available to adjust as needed thruout the entire L&D process.            Sanjeev Lutz MD    I have reviewed the pertinent notes and labs in the chart from the past 30 days and (re)examined the patient.  Any updates or changes from those notes are reflected in this note.                  "

## 2024-04-11 VITALS
DIASTOLIC BLOOD PRESSURE: 73 MMHG | TEMPERATURE: 98.6 F | HEART RATE: 68 BPM | OXYGEN SATURATION: 99 % | HEIGHT: 66 IN | WEIGHT: 174 LBS | BODY MASS INDEX: 27.97 KG/M2 | RESPIRATION RATE: 16 BRPM | SYSTOLIC BLOOD PRESSURE: 116 MMHG

## 2024-04-11 PROCEDURE — 250N000013 HC RX MED GY IP 250 OP 250 PS 637: Performed by: OBSTETRICS & GYNECOLOGY

## 2024-04-11 PROCEDURE — 250N000011 HC RX IP 250 OP 636: Mod: JZ | Performed by: OBSTETRICS & GYNECOLOGY

## 2024-04-11 RX ORDER — HYDROCODONE BITARTRATE AND ACETAMINOPHEN 5; 325 MG/1; MG/1
1-2 TABLET ORAL EVERY 6 HOURS PRN
Status: DISCONTINUED | OUTPATIENT
Start: 2024-04-11 | End: 2024-04-11 | Stop reason: HOSPADM

## 2024-04-11 RX ADMIN — DOCUSATE SODIUM 100 MG: 100 CAPSULE, LIQUID FILLED ORAL at 08:30

## 2024-04-11 RX ADMIN — ACETAMINOPHEN 650 MG: 325 TABLET, FILM COATED ORAL at 08:30

## 2024-04-11 RX ADMIN — HUMAN RHO(D) IMMUNE GLOBULIN 300 MCG: 1500 SOLUTION INTRAMUSCULAR; INTRAVENOUS at 05:20

## 2024-04-11 RX ADMIN — HYDROCODONE BITARTRATE AND ACETAMINOPHEN 1 TABLET: 5; 325 TABLET ORAL at 05:16

## 2024-04-11 RX ADMIN — HYDROCODONE BITARTRATE AND ACETAMINOPHEN 1 TABLET: 5; 325 TABLET ORAL at 12:45

## 2024-04-11 RX ADMIN — HYDROCODONE BITARTRATE AND ACETAMINOPHEN 1 TABLET: 5; 325 TABLET ORAL at 17:37

## 2024-04-11 RX ADMIN — ACETAMINOPHEN 650 MG: 325 TABLET, FILM COATED ORAL at 02:33

## 2024-04-11 RX ADMIN — IBUPROFEN 800 MG: 400 TABLET ORAL at 08:30

## 2024-04-11 RX ADMIN — IBUPROFEN 800 MG: 400 TABLET ORAL at 13:56

## 2024-04-11 RX ADMIN — IBUPROFEN 800 MG: 400 TABLET ORAL at 02:33

## 2024-04-11 ASSESSMENT — ACTIVITIES OF DAILY LIVING (ADL)
ADLS_ACUITY_SCORE: 18

## 2024-04-11 NOTE — PROGRESS NOTES
"OB PROGRESS NOTE    Postpartum Day 1: Vaginal Delivery    SUBJECTIVE  Feels well. Pain is well controlled with Tylenol and Ibuprofen.  She has no complaints.  She is urinating, tolerating a general diet and ambulating without difficulty.  Breast feeding is going well.  Lochia is moderate.  She denies emotional concerns.      EXAM  /76 (BP Location: Left arm, Patient Position: Semi-Snow's, Cuff Size: Adult Regular)   Pulse 65   Temp 98.1  F (36.7  C) (Oral)   Resp 16   Ht 1.676 m (5' 6\")   Wt 78.9 kg (174 lb)   SpO2 99%   Breastfeeding Unknown   BMI 28.08 kg/m      GENERAL APPEARANCE:  normal affect  ABDOMEN: soft, nontender, fundus firm below the umbilicus    Recent Results (from the past 2016 hour(s))   Group B Streptococcus (External Result)    Collection Time: 24 12:00 AM   Result Value Ref Range    Group B Streptococcus (External) Negative Negative   Treponema Abs w Reflex to RPR and Titer    Collection Time: 04/10/24  3:09 AM   Result Value Ref Range    Treponema Antibody Total Nonreactive Nonreactive   CBC with platelets    Collection Time: 04/10/24  3:09 AM   Result Value Ref Range    WBC Count 11.6 (H) 4.0 - 11.0 10e3/uL    RBC Count 3.53 (L) 3.80 - 5.20 10e6/uL    Hemoglobin 10.9 (L) 11.7 - 15.7 g/dL    Hematocrit 31.6 (L) 35.0 - 47.0 %    MCV 90 78 - 100 fL    MCH 30.9 26.5 - 33.0 pg    MCHC 34.5 31.5 - 36.5 g/dL    RDW 12.4 10.0 - 15.0 %    Platelet Count 172 150 - 450 10e3/uL   Adult Type and Screen    Collection Time: 04/10/24  3:09 AM   Result Value Ref Range    ABO/RH(D) O NEG     Antibody Screen Negative Negative    SPECIMEN EXPIRATION DATE     RH Immune Globulin Screen    Collection Time: 04/10/24  7:41 PM   Result Value Ref Range    ABO/RH(D) O NEG     Fetal Bleed Screen NEG Negative    SPECIMEN EXPIRATION DATE     ]    ASSESSMENT  Rachel Ryder is a 38 year old  PPD# 1 s/p  at 39+4 weeks gestation.  Doing well, uncomplicated " course.    PLAN    1) Routine post-partum cares. Encourage ambulation.  2) Rh negative.  Rhogam indicated and was given this morning.  3) Anticipatory guidance given.  4) Anticipate discharge tomorrow; discharge instructions written.  No Rx's needed.      Irene Adams MD  Obstetrics, Gynecology and Infertility

## 2024-04-11 NOTE — PROVIDER NOTIFICATION
"1330:  Patient complaining of an visual \"ora\" mostly on her left eye. Pt states she has had a couple of migraines in the past and this feels like a migraine is starting. RN checked BP (normal).     OB had been paged about patient discharging home today and discharge medications begin sent to patients pharmacy. So OB on the phone when this new patient complaint arose. Hydrocodone dose increased per MD.    Patient had been given an ice pack for her headache and laid down. By 1350 patient reports feeling better.    At this time, family is still discussing discharging home tonight by 1900.    "

## 2024-04-11 NOTE — LACTATION NOTE
"Lactation visit with Rachel, MISAEL, and baby Rupesh.    Rachel shares she  her first for 11 months but they had some ups and downs. Rachel shared her first struggled with her fast let down. We reviewed techniques for positions to help infant handle fast flow.  This LC was primary RN for this family and assisted with multiple feedings today. Infant heard swallowing with breastfeeding.    Reviewed  breastfeeding basics:   1. Watch for early feeding cues (licking lips, stirring or rooting, sucking movement with mouth, hands to mouth).  2. Infant should breastfeed on demand and a minimum of 8 times in 24 hours. Encourage/offer to breastfeed infant at least 3 hours (from the start of the last feeding).      Educated on techniques to wake a sleepy baby for feedings: un-swaddle infant, check infant's diaper, begin snuggling skin to skin and begin gentle stimulation including stroking infant's back and feet. Taught mom hand express colostrum.    Reviewed breast feeding section in our \"Guide to Postpartum and  Care.\" Highlighting pages that educates to  feeding patterns/behavior: Day 1 infant may be more sleepy (the birthday nap); followed by cluster-feeding (breastfeeding marathon) on second day/night. We reviewed the feeding log in back of booklet, how/why tracking infant's feedings and wet/dirty diapers is important.     Recommended infant is offered both breasts with every feeding session.  Reviewed breastfeeding positions and techniques to obtain/maintain deep latch, including nose to nipple alignment and how to support infant's shoulder blades and neck to allow flexion for optimal latch positioning. Discussed breastfeeding with a correct latch should feel like a strong \"tug or pull\". If infant's suckling feels more like a \"pinch or bite\", an adjustment to infant's latch is needed. Demonstrated how support person can assist to gently pull down on infant's chin to increase depth of latch. And if " "needed un-latch infant properly (techniques given), assess nipple shape and make any necessary adjustments with positioning before re-latching.     Discussed physiology of milk production from colostrum through milk \"coming in\" between day 3-5 (typically); emphasizing adequate stimulation to the breast is what causes this change to occur. Answered questions regarding \"how to know when infant is done at the breast\". Discussed listening for infant to have audible swallows along with watching for changes in infant's stool color. Discussed normal infant weight loss and when infant should be back to birth weight. Stressed the importance of continuing to track infant's feeds and void/stools patterns, at least until infant has returned to his birth weight.    Answered general pumping questions, finding correct flange size, etc. Offered ideas on how to \"build milk storage\", ie: pumping once infant is about one month of age (following first morning breast-feed). Educated on when to offer a bottle. Rachel has a new breast pump for home use.     Feeding plan recommendations: provide unlimited, on-demand breast feedings: At least 8-12 times/24 hours (reviewed early feeding cues). Suggested pumping if baby has a poor feeding or if supplementation is necessary. Encouraged on-going use of a feeding log or reginald to record feedings along with void/stool patterns. Avoid pacifiers (until 1 month of age per AAP guidelines) and supplementation with formula unless medically indicated.     Follow up with Pediatrician as requested and encouraged lactation follow up. Reviewed [place] outpatient lactation resources. Appreciative of visit.    Isabella Mason RN, IBCLC          "

## 2024-04-11 NOTE — PLAN OF CARE
Goal Outcome Evaluation:      Plan of Care Reviewed With: patient    Overall Patient Progress: improvingOverall Patient Progress: improving     Pt VSS. Fundus firm, scant flow. Pain controlled with Ibu/Tyl/Norco (for cramping). Voiding independently. Working on breast feeding with infant. Headache came on this afternoon, Norco dose increased by MD if patient needs additional pain control. Family would like to discharge home this evening.

## 2024-04-11 NOTE — DISCHARGE INSTRUCTIONS
"Postpartum Care at Home With Your Baby: Care Instructions  Overview     After childbirth (postpartum period), your body goes through many changes as you recover. In these weeks after delivery, try to take good care of yourself. Get rest whenever you can and accept help from others.  It may take 4 to 6 weeks to feel like yourself again, and possibly longer if you had a  birth. You may feel sore or very tired as you recover. After delivery, you may continue to have contractions as the uterus returns to the size it was before your pregnancy. You will also have some vaginal bleeding. And you may have pain around the vagina as you heal. Several days after delivery you may also have pain and swelling in your breasts as they fill with milk. There are things you can do at home to help ease these discomforts.  After childbirth, it's common to feel emotional. You may feel irritable, cry easily, and feel happy one minute and sad the next. This is called the \"baby blues.\" Hormone changes are one cause of these emotional changes. These feelings usually get better within a couple of weeks. If they don't, talk to your doctor or midwife.  In the first couple of weeks after you give birth, your doctor or midwife may want to check in with you and make a plan for follow-up care. You will likely have a complete postpartum visit in the first 3 months after delivery. At that time, your doctor or midwife will check on your recovery and see how you're doing. But if you have questions or concerns before then, you can always call your doctor or midwife.  Follow-up care is a key part of your treatment and safety. Be sure to make and go to all appointments, and call your doctor if you are having problems. It's also a good idea to know your test results and keep a list of the medicines you take.  How can you care for yourself at home?  Taking care of your body  Use pads instead of tampons for bleeding. After birth, you will have bloody " vaginal discharge. You may also pass some blood clots that shouldn't be bigger than an egg. Over the next 6 weeks or so, your bleeding should decrease a little every day and slowly change to a pinkish and then whitish discharge.  For cramps or mild pain, try an over-the-counter pain medicine, such as acetaminophen (Tylenol) or ibuprofen (Advil, Motrin). Read and follow all instructions on the label.  To ease pain around the vagina or from hemorrhoids:  Put ice or a cold pack on the area for 10 to 20 minutes at a time. Put a thin cloth between the ice and your skin.  Try sitting in a few inches of warm water (sitz bath) when you can or after bowel movements.  Clean yourself with a gentle squeeze of warm water from a bottle instead of wiping with toilet paper.  Use witch hazel or hemorrhoid pads (such as Tucks).  Try using a cold compress for sore and swollen breasts. And wear a supportive bra that fits.  Ease constipation by drinking plenty of fluids and eating high-fiber foods. Ask your doctor or midwife about over-the-counter stool softeners.  Activity  Rest when you can.  Ask for help from family or friends when you need it.  If you can, have another adult in your home for at least 2 or 3 days after birth.  When you feel ready, try to get some exercise every day. For many people, walking is a good choice. Don't do any heavy exercise until your doctor or midwife says it's okay.  Ask your doctor or midwife when it is okay to have vaginal sex.  If you don't want to get pregnant, talk to your doctor or midwife about birth control options. You can get pregnant even before your period returns. Also, you can get pregnant while you are breastfeeding.  Talk to your doctor or midwife if you want to get pregnant again. They can talk to you about when it is safe.  Emotional health  It's normal to have some sadness, anxiety, and mood swings after delivery. It may help to talk with a trusted friend or family member. You can  also call the Maternal Mental Health Hotline at 3-882-NBL-MAMA (1-827.799.7988) for support. If these mood changes last more than a couple of weeks, talk to your doctor or midwife.  When should you call for help?  Share this information with your partner, family, or a friend. They can help you watch for warning signs.  Call 911  anytime you think you may need emergency care. For example, call if:    You feel you cannot stop from hurting yourself, your baby, or someone else.     You passed out (lost consciousness).     You have chest pain, are short of breath, or cough up blood.     You have a seizure.   Where to get help 24 hours a day, 7 days a week   If you or someone you know talks about suicide, self-harm, a mental health crisis, a substance use crisis, or any other kind of emotional distress, get help right away. You can:    Call the Suicide and Crisis Lifeline at 988.     Call 7-798-935-TALK (1-710.196.8348).     Text HOME to 357453 to access the Crisis Text Line.   Consider saving these numbers in your phone.  Go to [employer] for more information or to chat online.  Call your doctor or midwife now or seek immediate medical care if:    You have signs of hemorrhage (too much bleeding), such as:  Heavy vaginal bleeding. This means that you are soaking through one or more pads in an hour. Or you pass blood clots bigger than an egg.  Feeling dizzy or lightheaded, or you feel like you may faint.  Feeling so tired or weak that you cannot do your usual activities.  A fast or irregular heartbeat.  New or worse belly pain.     You have signs of infection, such as:  A fever.  Increased pain, swelling, warmth, or redness from an incision or wound.  Frequent or painful urination or blood in your urine.  Vaginal discharge that smells bad.  New or worse belly pain.     You have symptoms of a blood clot in your leg (called a deep vein thrombosis), such as:  Pain in the calf, back of the knee, thigh, or groin.  Swelling  "in the leg or groin.  A color change on the leg or groin. The skin may be reddish or purplish, depending on your usual skin color.     You have signs of preeclampsia, such as:  Sudden swelling of your face, hands, or feet.  New vision problems (such as dimness, blurring, or seeing spots).  A severe headache.     You have signs of heart failure, such as:  New or increased shortness of breath.  New or worse swelling in your legs, ankles, or feet.  Sudden weight gain, such as more than 2 to 3 pounds in a day or 5 pounds in a week.  Feeling so tired or weak that you cannot do your usual activities.     You had spinal or epidural pain relief and have:  New or worse back pain.  Increased pain, swelling, warmth, or redness at the injection site.  Tingling, weakness, or numbness in your legs or groin.   Watch closely for changes in your health, and be sure to contact your doctor or midwife if:    Your vaginal bleeding isn't decreasing.     You feel sad, anxious, or hopeless for more than a few days.     You are having problems with your breasts or breastfeeding.   Where can you learn more?  Go to https://www.[employer].net/patiented  Enter Z768 in the search box to learn more about \"Postpartum Care at Home With Your Baby: Care Instructions.\"  Current as of: July 10, 2023               Content Version: 14.0    2349-7590 [employer].   Care instructions adapted under license by your healthcare professional. If you have questions about a medical condition or this instruction, always ask your healthcare professional. [employer] disclaims any warranty or liability for your use of this information.    Warning Signs after Having a Baby    Keep this paper on your fridge or somewhere else where you can see it.    Call your provider if you have any of these symptoms up to 12 weeks after having your baby.    Thoughts of hurting yourself or your baby  Pain in your chest or trouble breathing  Severe headache " not helped by pain medicine  Eyesight concerns (blurry vision, seeing spots or flashes of light, other changes to eyesight)  Fainting, shaking or other signs of a seizure    Call 9-1-1 if you feel that it is an emergency.     The symptoms below can happen to anyone after giving birth. They can be very serious. Call your provider if you have any of these warning signs.    My provider s phone number: _______________________    Losing too much blood (hemorrhage)    Call your provider if you soak through a pad in less than an hour or pass blood clots bigger than a golf ball. These may be signs that you are bleeding too much.    Blood clots in the legs or lungs    After you give birth, your body naturally clots its blood to help prevent blood loss. Sometimes this increased clotting can happen in other areas of the body, like the legs or lungs. This can block your blood flow and be very dangerous.     Call your provider if you:  Have a red, swollen spot on the back of your leg that is warm or painful when you touch it.   Are coughing up blood.     Infection    Call your provider if you have any of these symptoms:  Fever of 100.4 F (38 C) or higher.  Pain or redness around your stitches if you had an incision.   Any yellow, white, or green fluid coming from places where you had stitches or surgery.    Mood Problems (postpartum depression)    Many people feel sad or have mood changes after having a baby. But for some people, these mood swings are worse.     Call your provider right away if you feel so anxious or nervous that you can't care for yourself or your baby.    Preeclampsia (high blood pressure)    Even if you didn't have high blood pressure when you were pregnant, you are at risk for the high blood pressure disease called preeclampsia. This risk can last up to 12 weeks after giving birth.     Call your provider if you have:   Pain on your right side under your rib cage  Sudden swelling in the hands and  face    Remember: You know your body. If something doesn't feel right, get medical help.     For informational purposes only. Not to replace the advice of your health care provider. Copyright 2020 [employer]. All rights reserved. Clinically reviewed by Vaishali Sebastian RNC-OB, MSN. Adeze [id number] - Rev 02/23.

## 2024-04-11 NOTE — PLAN OF CARE
Vital signs stable. Postpartum assessment within normal limits. Patient expressed severe cramping, received orders for hydrocodone 5-325 mg by mouth every 6 hours for pain. Pain controlled with tylenol and ibuprofen and hydrocodone as needed. Patient voiding without difficulty. Breastfeeding on cue with minimal assist. Patient and infant bonding well. Will continue with current plan of care.

## 2024-04-11 NOTE — PROVIDER NOTIFICATION
Start patient on doses of Hydrocodone with Tylenol (NORCO) 5-325 tab for moderate cramping pain.   04/10/24 5985   Provider Notification   Provider Name/Title Dr. Sow   Method of Notification Phone   Request Evaluate-Remote   Notification Reason Medication Request

## 2024-04-12 NOTE — ANESTHESIA POSTPROCEDURE EVALUATION
Patient: Rachel Ryder    Procedure: * No procedures listed *       Anesthesia Type:  Epidural    Note:  Disposition: Outpatient   Postop Pain Control: Uneventful            Sign Out: Well controlled pain   PONV: No   Neuro/Psych: Uneventful            Sign Out: Acceptable/Baseline neuro status   Airway/Respiratory: Uneventful            Sign Out: Acceptable/Baseline resp. status   CV/Hemodynamics: Uneventful            Sign Out: Acceptable CV status; No obvious hypovolemia; No obvious fluid overload   Other NRE: NONE   DID A NON-ROUTINE EVENT OCCUR? No    Event details/Postop Comments:  The patient denies numbness, weakness, or tingling in either of the lower extremities; denies positional headache; and denies significant back pain. The patient was then counseled on any potential concerning symptoms and if/when to reach out for further guidance. They expressed understanding of the instructions and felt comfortable with the plan.           Last vitals:  Vitals:    04/11/24 0815 04/11/24 1333 04/11/24 1604   BP: 106/67 122/72 116/73   Pulse: 62 62 68   Resp: 16  16   Temp: 36.6  C (97.9  F)  37  C (98.6  F)   SpO2:          Electronically Signed By: Shayne Ram MD  April 11, 2024  7:31 PM

## 2024-12-31 ENCOUNTER — OFFICE VISIT (OUTPATIENT)
Dept: URGENT CARE | Facility: URGENT CARE | Age: 39
End: 2024-12-31
Payer: COMMERCIAL

## 2024-12-31 VITALS
HEIGHT: 66 IN | WEIGHT: 136 LBS | BODY MASS INDEX: 21.86 KG/M2 | TEMPERATURE: 97.9 F | OXYGEN SATURATION: 98 % | HEART RATE: 82 BPM | DIASTOLIC BLOOD PRESSURE: 84 MMHG | SYSTOLIC BLOOD PRESSURE: 131 MMHG | RESPIRATION RATE: 16 BRPM

## 2024-12-31 DIAGNOSIS — H00.15 CHALAZION OF LEFT LOWER EYELID: ICD-10-CM

## 2024-12-31 DIAGNOSIS — H10.9 CONJUNCTIVITIS OF LEFT EYE, UNSPECIFIED CONJUNCTIVITIS TYPE: Primary | ICD-10-CM

## 2024-12-31 PROCEDURE — 99203 OFFICE O/P NEW LOW 30 MIN: CPT | Performed by: FAMILY MEDICINE

## 2024-12-31 RX ORDER — POLYMYXIN B SULFATE AND TRIMETHOPRIM 1; 10000 MG/ML; [USP'U]/ML
1-2 SOLUTION OPHTHALMIC EVERY 4 HOURS
Qty: 10 ML | Refills: 0 | Status: SHIPPED | OUTPATIENT
Start: 2024-12-31 | End: 2024-12-31

## 2024-12-31 RX ORDER — ERYTHROMYCIN 5 MG/G
0.5 OINTMENT OPHTHALMIC 4 TIMES DAILY
Qty: 3.5 G | Refills: 0 | Status: SHIPPED | OUTPATIENT
Start: 2024-12-31

## 2024-12-31 NOTE — PROGRESS NOTES
"Assessment & Plan     Chalazion of left lower eyelid  ?chalazion  To see optho    Conjunctivitis of left eye, unspecified conjunctivitis type  Ointment  Breastfeeding  Hot pack  - erythromycin (ROMYCIN) 5 MG/GM ophthalmic ointment  Dispense: 3.5 g; Refill: 0             No follow-ups on file.    Loco Muller MD  [facility] RAMIREZ Ramos is a 39 year old female who presents to clinic today for the following health issues:  Chief Complaint   Patient presents with    Eye Problem     Stye in left eye on and off for x6 months   Now started having crusted eye in the morning and pain     Urgent Care    Nasal Congestion    Cough       HPI    Left eye with crusty and goppy   Left lower lid has lump for months.  Nasal congestion/cough but improving.        Review of Systems        Objective    /84   Pulse 82   Temp 97.9  F (36.6  C) (Oral)   Resp 16   Ht 1.676 m (5' 6\")   Wt 61.7 kg (136 lb)   SpO2 98%   Breastfeeding Yes   BMI 21.95 kg/m    Physical Exam  Vitals and nursing note reviewed.   Constitutional:       Appearance: Normal appearance.   Eyes:      General:         Left eye: Discharge present.     Extraocular Movements: Extraocular movements intact.      Pupils: Pupils are equal, round, and reactive to light.      Comments: Left eye with stye like lesion on left lower lid   Cardiovascular:      Rate and Rhythm: Normal rate and regular rhythm.      Pulses: Normal pulses.      Heart sounds: Normal heart sounds.   Pulmonary:      Effort: Pulmonary effort is normal.      Breath sounds: Normal breath sounds.   Neurological:      Mental Status: She is alert.                    "

## 2025-06-11 ENCOUNTER — OFFICE VISIT (OUTPATIENT)
Dept: FAMILY MEDICINE | Facility: CLINIC | Age: 40
End: 2025-06-11

## 2025-06-11 VITALS
WEIGHT: 139 LBS | OXYGEN SATURATION: 99 % | HEIGHT: 67 IN | BODY MASS INDEX: 21.82 KG/M2 | HEART RATE: 74 BPM | SYSTOLIC BLOOD PRESSURE: 109 MMHG | DIASTOLIC BLOOD PRESSURE: 83 MMHG

## 2025-06-11 DIAGNOSIS — M53.3 COCCYDYNIA: Primary | ICD-10-CM

## 2025-06-11 PROCEDURE — G2211 COMPLEX E/M VISIT ADD ON: HCPCS | Performed by: FAMILY MEDICINE

## 2025-06-11 PROCEDURE — 99204 OFFICE O/P NEW MOD 45 MIN: CPT | Performed by: FAMILY MEDICINE

## 2025-06-11 PROCEDURE — 3074F SYST BP LT 130 MM HG: CPT | Performed by: FAMILY MEDICINE

## 2025-06-11 PROCEDURE — 3079F DIAST BP 80-89 MM HG: CPT | Performed by: FAMILY MEDICINE

## 2025-06-11 RX ORDER — METHYLPREDNISOLONE 4 MG/1
4 TABLET ORAL DAILY
COMMUNITY
Start: 2025-06-05

## 2025-06-11 NOTE — PROGRESS NOTES
"Answers submitted by the patient for this visit:  General Questionnaire (Submitted on 6/11/2025)  Chief Complaint: Chronic problems general questions HPI Form  What is the reason for your visit today? : tailbone pain  How many servings of fruits and vegetables do you eat daily?: 2-3  On average, how many sweetened beverages do you drink each day (Examples: soda, juice, sweet tea, etc.  Do NOT count diet or artificially sweetened beverages)?: 0  How many minutes a day do you exercise enough to make your heart beat faster?: 9 or less  How many days a week do you exercise enough to make your heart beat faster?: 3 or less  How many days per week do you miss taking your medication?: 0  Questionnaire about: Chronic problems general questions HPI Form (Submitted on 6/11/2025)  Chief Complaint: Chronic problems general questions HPI Form      Subjective     Rachel is a 40 year old patient who presents to clinic for evaluation.  Reports she has had tailbone pain for about 5-6 months.  No trauma.  Pain has increased lately.  She saw a chiropractor who tried adjustments but did not improve.  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  She was then seen but do not have records.  Pelvic PT was recommended but not started yet.  She is hoping to rule out other causes of the pain and worries about colon cancer.  She denies fever, chills, change in stools, abdominal pain, hematochezia or melena.  She was given a medrol dose pack at [facility] and is nearly complete with it.  It has helped the pain.        Review of Systems   Constitutional, HEENT, cardiovascular, pulmonary, GI, , musculoskeletal, neuro, skin, endocrine and psych systems are negative, except as otherwise noted.      Objective    /83   Pulse 74   Ht 1.689 m (5' 6.5\")   Wt 63 kg (139 lb)   SpO2 99%   BMI 22.10 kg/m      General: Well appearing, NAD  Back: no lumbar or paraspinal tenderness.  " Normal ROM.  Rectal: chaperoned exam.  No distal rectal mass.  Focal tenderness to palpation overlying coccyx.  Psych: normal mood and affect        No results found for this or any previous visit (from the past 24 hours).    Coccydynia  We discussed in detail.  At this time no evidence of a distal rectal mass and no other symptoms to suggest colorectal cancer.  Exam is most consistent with coccydynia of uncertain etiology.  Agree that pelvic PT is a reasonable conservative option to try.  I also discussed the case with Dr Rodriguez at [facility] who felt she may benefit for an injection if the pain persists.  A referral has been placed.  - Adult Physical Medicine and Rehab  Referral - To a [facility] Location (Use POS/Location); Future    Follow up based on clinical course.

## (undated) DEVICE — ENDO SCOPE WARMER LF TM500

## (undated) DEVICE — GLOVE PROTEXIS W/NEU-THERA 6.5  2D73TE65

## (undated) DEVICE — KIT PATIENT POSITIONING PIGAZZI LATEX FREE 40580

## (undated) DEVICE — SOL WATER IRRIG 1000ML BOTTLE 2F7114

## (undated) DEVICE — Device

## (undated) DEVICE — ENDO TROCAR FIRST ENTRY KII FIOS Z-THRD 05X100MM CTF03

## (undated) DEVICE — SPECIMEN TRAP VACUUM SUCTION SAFETOUCH 003853-902

## (undated) DEVICE — DRSG STERI STRIP 1/4X3" R1541

## (undated) DEVICE — SYR 01ML TBC SLIP TIP W/O NDL

## (undated) DEVICE — PAD CHUX UNDERPAD 30X36" P3036C

## (undated) DEVICE — SOL NACL 0.9% INJ 1000ML BAG 2B1324X

## (undated) DEVICE — ENDO TROCAR SLEEVE KII Z-THREADED 05X100MM CTS02

## (undated) DEVICE — SUCTION CANNULA UTERINE 12MM CVD 022112-10

## (undated) DEVICE — SOL NACL 0.9% IRRIG 1000ML BOTTLE 2F7124

## (undated) DEVICE — ENDO TROCAR SLEEVE KII ADV FIXATION 05X100MM CFS02

## (undated) DEVICE — NDL 18GA 1.5" 305196

## (undated) DEVICE — ENDO TROCAR OPTICAL ACCESS KII Z-THRD 08X100MM C0Q19

## (undated) DEVICE — PREP SCRUB SOL EXIDINE 4% CHG 4OZ 29002-404

## (undated) DEVICE — LINEN TOWEL PACK X5 5464

## (undated) DEVICE — SUCTION VACUUM CANISTER STANDARD W/LID&CAPS 003987-901

## (undated) DEVICE — GLOVE PROTEXIS BLUE W/NEU-THERA 7.0  2D73EB70

## (undated) DEVICE — SU MONOCRYL 4-0 P-3 18" UND Y494G

## (undated) DEVICE — DRAPE TIBURON TOP SHEET 100X60" 29352

## (undated) DEVICE — LINEN GOWN X4 5410

## (undated) DEVICE — ESU LIGASURE LAPAROSCOPIC BLUNT TIP SEALER 5MMX37CM LF1837

## (undated) DEVICE — LINEN LEG DRAPE 5457

## (undated) DEVICE — NDL INSUFFLATION 13GA 120MM C2201

## (undated) DEVICE — GOWN XLG DISP 9545

## (undated) DEVICE — ENDO TROCAR FIRST ENTRY KII FIOS ADV FIX 05X100MM CFF03

## (undated) DEVICE — SUCTION IRR STRYKERFLOW II W/TIP 250-070-520

## (undated) DEVICE — ESU HOLDER LAP INST DISP PURPLE LONG 330MM H-PRO-330

## (undated) RX ORDER — LIDOCAINE HYDROCHLORIDE 10 MG/ML
INJECTION, SOLUTION EPIDURAL; INFILTRATION; INTRACAUDAL; PERINEURAL
Status: DISPENSED
Start: 2022-09-20

## (undated) RX ORDER — OXYCODONE HYDROCHLORIDE 5 MG/1
TABLET ORAL
Status: DISPENSED
Start: 2022-09-20

## (undated) RX ORDER — PROPOFOL 10 MG/ML
INJECTION, EMULSION INTRAVENOUS
Status: DISPENSED
Start: 2022-11-30

## (undated) RX ORDER — IBUPROFEN 400 MG/1
TABLET, FILM COATED ORAL
Status: DISPENSED
Start: 2022-09-20

## (undated) RX ORDER — ACETAMINOPHEN 325 MG/1
TABLET ORAL
Status: DISPENSED
Start: 2022-09-20

## (undated) RX ORDER — CEFAZOLIN SODIUM 1 G/3ML
INJECTION, POWDER, FOR SOLUTION INTRAMUSCULAR; INTRAVENOUS
Status: DISPENSED
Start: 2022-11-30

## (undated) RX ORDER — PROPOFOL 10 MG/ML
INJECTION, EMULSION INTRAVENOUS
Status: DISPENSED
Start: 2022-09-20

## (undated) RX ORDER — HYDROCODONE BITARTRATE AND ACETAMINOPHEN 5; 325 MG/1; MG/1
TABLET ORAL
Status: DISPENSED
Start: 2022-11-30

## (undated) RX ORDER — BUPIVACAINE HYDROCHLORIDE 2.5 MG/ML
INJECTION, SOLUTION EPIDURAL; INFILTRATION; INTRACAUDAL
Status: DISPENSED
Start: 2022-11-30

## (undated) RX ORDER — FENTANYL CITRATE 50 UG/ML
INJECTION, SOLUTION INTRAMUSCULAR; INTRAVENOUS
Status: DISPENSED
Start: 2022-09-20

## (undated) RX ORDER — LIDOCAINE HYDROCHLORIDE 20 MG/ML
INJECTION, SOLUTION EPIDURAL; INFILTRATION; INTRACAUDAL; PERINEURAL
Status: DISPENSED
Start: 2022-09-20

## (undated) RX ORDER — MEPERIDINE HYDROCHLORIDE 25 MG/ML
INJECTION INTRAMUSCULAR; INTRAVENOUS; SUBCUTANEOUS
Status: DISPENSED
Start: 2022-11-30

## (undated) RX ORDER — VASOPRESSIN 20 U/ML
INJECTION PARENTERAL
Status: DISPENSED
Start: 2022-09-20

## (undated) RX ORDER — FENTANYL CITRATE 50 UG/ML
INJECTION, SOLUTION INTRAMUSCULAR; INTRAVENOUS
Status: DISPENSED
Start: 2022-11-30

## (undated) RX ORDER — FENTANYL CITRATE 0.05 MG/ML
INJECTION, SOLUTION INTRAMUSCULAR; INTRAVENOUS
Status: DISPENSED
Start: 2022-11-30